# Patient Record
Sex: MALE | Race: WHITE | NOT HISPANIC OR LATINO | Employment: OTHER | URBAN - METROPOLITAN AREA
[De-identification: names, ages, dates, MRNs, and addresses within clinical notes are randomized per-mention and may not be internally consistent; named-entity substitution may affect disease eponyms.]

---

## 2017-02-06 ENCOUNTER — COMMUNICATION - HEALTHEAST (OUTPATIENT)
Dept: HEALTH INFORMATION MANAGEMENT | Facility: CLINIC | Age: 47
End: 2017-02-06

## 2018-02-23 ENCOUNTER — COMMUNICATION - HEALTHEAST (OUTPATIENT)
Dept: FAMILY MEDICINE | Facility: CLINIC | Age: 48
End: 2018-02-23

## 2018-03-26 ENCOUNTER — OFFICE VISIT - HEALTHEAST (OUTPATIENT)
Dept: FAMILY MEDICINE | Facility: CLINIC | Age: 48
End: 2018-03-26

## 2018-03-26 DIAGNOSIS — Z13.220 ENCOUNTER FOR SCREENING FOR LIPOID DISORDERS: ICD-10-CM

## 2018-03-26 DIAGNOSIS — Z00.00 ENCOUNTER FOR GENERAL ADULT MEDICAL EXAMINATION WITHOUT ABNORMAL FINDINGS: ICD-10-CM

## 2018-03-26 DIAGNOSIS — M54.9 MID BACK PAIN: ICD-10-CM

## 2018-03-26 DIAGNOSIS — Z23 NEED FOR VACCINATION: ICD-10-CM

## 2018-03-26 DIAGNOSIS — Z13.1 ENCOUNTER FOR SCREENING FOR DIABETES MELLITUS: ICD-10-CM

## 2018-03-26 DIAGNOSIS — Z72.0 TOBACCO ABUSE: ICD-10-CM

## 2018-03-26 ASSESSMENT — MIFFLIN-ST. JEOR: SCORE: 1659.58

## 2018-03-28 ENCOUNTER — AMBULATORY - HEALTHEAST (OUTPATIENT)
Dept: LAB | Facility: CLINIC | Age: 48
End: 2018-03-28

## 2018-03-28 DIAGNOSIS — Z00.00 ENCOUNTER FOR GENERAL ADULT MEDICAL EXAMINATION WITHOUT ABNORMAL FINDINGS: ICD-10-CM

## 2018-03-28 DIAGNOSIS — Z13.220 ENCOUNTER FOR SCREENING FOR LIPOID DISORDERS: ICD-10-CM

## 2018-03-28 LAB
ANION GAP SERPL CALCULATED.3IONS-SCNC: 13 MMOL/L (ref 5–18)
BUN SERPL-MCNC: 19 MG/DL (ref 8–22)
CALCIUM SERPL-MCNC: 9 MG/DL (ref 8.5–10.5)
CHLORIDE BLD-SCNC: 103 MMOL/L (ref 98–107)
CHOLEST SERPL-MCNC: 185 MG/DL
CO2 SERPL-SCNC: 24 MMOL/L (ref 22–31)
CREAT SERPL-MCNC: 0.93 MG/DL (ref 0.7–1.3)
FASTING STATUS PATIENT QL REPORTED: YES
GFR SERPL CREATININE-BSD FRML MDRD: >60 ML/MIN/1.73M2
GLUCOSE BLD-MCNC: 96 MG/DL (ref 70–125)
HDLC SERPL-MCNC: 40 MG/DL
LDLC SERPL CALC-MCNC: 98 MG/DL
POTASSIUM BLD-SCNC: 4 MMOL/L (ref 3.5–5)
SODIUM SERPL-SCNC: 140 MMOL/L (ref 136–145)
TRIGL SERPL-MCNC: 237 MG/DL

## 2019-10-07 ENCOUNTER — OFFICE VISIT - HEALTHEAST (OUTPATIENT)
Dept: FAMILY MEDICINE | Facility: CLINIC | Age: 49
End: 2019-10-07

## 2019-10-07 DIAGNOSIS — Z12.11 SCREEN FOR COLON CANCER: ICD-10-CM

## 2019-10-07 DIAGNOSIS — Z13.220 ENCOUNTER FOR SCREENING FOR LIPOID DISORDERS: ICD-10-CM

## 2019-10-07 DIAGNOSIS — Z72.0 TOBACCO ABUSE: ICD-10-CM

## 2019-10-07 DIAGNOSIS — Z00.01 ENCOUNTER FOR ROUTINE ADULT HEALTH EXAMINATION WITH ABNORMAL FINDINGS: ICD-10-CM

## 2019-10-07 DIAGNOSIS — R07.9 CHEST PAIN, UNSPECIFIED TYPE: ICD-10-CM

## 2019-10-07 LAB
ANION GAP SERPL CALCULATED.3IONS-SCNC: 9 MMOL/L (ref 5–18)
ATRIAL RATE - MUSE: 63 BPM
BUN SERPL-MCNC: 21 MG/DL (ref 8–22)
CALCIUM SERPL-MCNC: 9.6 MG/DL (ref 8.5–10.5)
CHLORIDE BLD-SCNC: 105 MMOL/L (ref 98–107)
CHOLEST SERPL-MCNC: 197 MG/DL
CO2 SERPL-SCNC: 27 MMOL/L (ref 22–31)
CREAT SERPL-MCNC: 1.09 MG/DL (ref 0.7–1.3)
DIASTOLIC BLOOD PRESSURE - MUSE: NORMAL
ERYTHROCYTE [DISTWIDTH] IN BLOOD BY AUTOMATED COUNT: 11 % (ref 11–14.5)
FASTING STATUS PATIENT QL REPORTED: YES
GFR SERPL CREATININE-BSD FRML MDRD: >60 ML/MIN/1.73M2
GLUCOSE BLD-MCNC: 101 MG/DL (ref 70–125)
HCT VFR BLD AUTO: 44.9 % (ref 40–54)
HDLC SERPL-MCNC: 45 MG/DL
HGB BLD-MCNC: 15 G/DL (ref 14–18)
INTERPRETATION ECG - MUSE: NORMAL
LDLC SERPL CALC-MCNC: 121 MG/DL
MCH RBC QN AUTO: 30.7 PG (ref 27–34)
MCHC RBC AUTO-ENTMCNC: 33.4 G/DL (ref 32–36)
MCV RBC AUTO: 92 FL (ref 80–100)
P AXIS - MUSE: 51 DEGREES
PLATELET # BLD AUTO: 190 THOU/UL (ref 140–440)
PMV BLD AUTO: 8.3 FL (ref 7–10)
POTASSIUM BLD-SCNC: 4.2 MMOL/L (ref 3.5–5)
PR INTERVAL - MUSE: 144 MS
QRS DURATION - MUSE: 94 MS
QT - MUSE: 386 MS
QTC - MUSE: 395 MS
R AXIS - MUSE: -3 DEGREES
RBC # BLD AUTO: 4.89 MILL/UL (ref 4.4–6.2)
SODIUM SERPL-SCNC: 141 MMOL/L (ref 136–145)
SYSTOLIC BLOOD PRESSURE - MUSE: NORMAL
T AXIS - MUSE: 16 DEGREES
TRIGL SERPL-MCNC: 157 MG/DL
TSH SERPL DL<=0.005 MIU/L-ACNC: 0.87 UIU/ML (ref 0.3–5)
VENTRICULAR RATE- MUSE: 63 BPM
WBC: 6.2 THOU/UL (ref 4–11)

## 2019-10-07 ASSESSMENT — MIFFLIN-ST. JEOR: SCORE: 1674.61

## 2019-10-22 ENCOUNTER — HOSPITAL ENCOUNTER (OUTPATIENT)
Dept: CARDIOLOGY | Facility: HOSPITAL | Age: 49
Discharge: HOME | End: 2019-10-22
Attending: FAMILY MEDICINE

## 2019-10-22 DIAGNOSIS — R07.9 CHEST PAIN, UNSPECIFIED TYPE: ICD-10-CM

## 2019-10-22 LAB
CV STRESS MAX HR HE: 146
ECHO EJECTION FRACTION ESTIMATED: 55 %
RATE PRESSURE PRODUCT: NORMAL
STRESS ECHO BASELINE DIASTOLIC HE: 86
STRESS ECHO BASELINE HR: 67 BPM
STRESS ECHO BASELINE SYSTOLIC BP: 132
STRESS ECHO CALCULATED PERCENT HR: 86 %
STRESS ECHO LAST STRESS DIASTOLIC BP: 80
STRESS ECHO LAST STRESS SYSTOLIC BP: 178
STRESS ECHO POST EXERCISE DUR MIN: 8 MIN
STRESS ECHO POST EXERCISE DUR SEC: 58 SEC
STRESS ECHO TARGET HR: 170

## 2019-12-17 ENCOUNTER — OFFICE VISIT - HEALTHEAST (OUTPATIENT)
Dept: FAMILY MEDICINE | Facility: CLINIC | Age: 49
End: 2019-12-17

## 2019-12-17 DIAGNOSIS — B36.0 TINEA VERSICOLOR: ICD-10-CM

## 2019-12-17 DIAGNOSIS — L91.8 SKIN TAG: ICD-10-CM

## 2019-12-17 DIAGNOSIS — L82.1 SEBORRHEIC KERATOSIS: ICD-10-CM

## 2019-12-17 ASSESSMENT — MIFFLIN-ST. JEOR: SCORE: 1679.14

## 2021-04-12 ENCOUNTER — OFFICE VISIT - HEALTHEAST (OUTPATIENT)
Dept: FAMILY MEDICINE | Facility: CLINIC | Age: 51
End: 2021-04-12

## 2021-04-12 DIAGNOSIS — Z71.9 HEALTH COUNSELING: ICD-10-CM

## 2021-04-23 ENCOUNTER — OFFICE VISIT - HEALTHEAST (OUTPATIENT)
Dept: FAMILY MEDICINE | Facility: CLINIC | Age: 51
End: 2021-04-23

## 2021-04-23 DIAGNOSIS — Z84.81 FAMILY HISTORY OF BRCA GENE MUTATION: ICD-10-CM

## 2021-04-23 ASSESSMENT — MIFFLIN-ST. JEOR: SCORE: 1678.97

## 2021-05-03 ENCOUNTER — RECORDS - HEALTHEAST (OUTPATIENT)
Dept: ADMINISTRATIVE | Facility: OTHER | Age: 51
End: 2021-05-03

## 2021-05-03 ENCOUNTER — OFFICE VISIT - HEALTHEAST (OUTPATIENT)
Dept: ONCOLOGY | Facility: HOSPITAL | Age: 51
End: 2021-05-03

## 2021-05-03 DIAGNOSIS — Z84.81 FAMILY HISTORY OF BRCA1 GENE POSITIVE: ICD-10-CM

## 2021-05-03 DIAGNOSIS — Z80.3 FAMILY HISTORY OF MALIGNANT NEOPLASM OF BREAST: ICD-10-CM

## 2021-05-03 DIAGNOSIS — Z80.41 FAMILY HISTORY OF MALIGNANT NEOPLASM OF OVARY: ICD-10-CM

## 2021-05-11 ENCOUNTER — AMBULATORY - HEALTHEAST (OUTPATIENT)
Dept: INFUSION THERAPY | Facility: HOSPITAL | Age: 51
End: 2021-05-11

## 2021-05-11 ENCOUNTER — RECORDS - HEALTHEAST (OUTPATIENT)
Dept: ADMINISTRATIVE | Facility: OTHER | Age: 51
End: 2021-05-11

## 2021-05-11 DIAGNOSIS — Z80.3 FAMILY HISTORY OF MALIGNANT NEOPLASM OF BREAST: ICD-10-CM

## 2021-05-11 DIAGNOSIS — Z80.41 FAMILY HISTORY OF MALIGNANT NEOPLASM OF OVARY: ICD-10-CM

## 2021-05-11 DIAGNOSIS — Z84.81 FAMILY HISTORY OF BRCA1 GENE POSITIVE: ICD-10-CM

## 2021-05-27 ENCOUNTER — OFFICE VISIT - HEALTHEAST (OUTPATIENT)
Dept: ONCOLOGY | Facility: CLINIC | Age: 51
End: 2021-05-27

## 2021-05-27 DIAGNOSIS — Z15.09 BRCA1 GENE MUTATION POSITIVE IN MALE: ICD-10-CM

## 2021-05-27 DIAGNOSIS — Z15.03 BRCA1 GENE MUTATION POSITIVE IN MALE: ICD-10-CM

## 2021-05-27 DIAGNOSIS — Z71.83 ENCOUNTER FOR NONPROCREATIVE GENETIC COUNSELING: ICD-10-CM

## 2021-05-27 DIAGNOSIS — Z80.3 FAMILY HISTORY OF MALIGNANT NEOPLASM OF BREAST: ICD-10-CM

## 2021-05-27 DIAGNOSIS — Z80.41 FAMILY HISTORY OF MALIGNANT NEOPLASM OF OVARY: ICD-10-CM

## 2021-05-27 DIAGNOSIS — Z15.01 BRCA1 GENE MUTATION POSITIVE IN MALE: ICD-10-CM

## 2021-06-01 VITALS — HEIGHT: 68 IN | WEIGHT: 183 LBS | BODY MASS INDEX: 27.74 KG/M2

## 2021-06-02 NOTE — PROGRESS NOTES
MALE PREVENTATIVE EXAM    Assessment and Plan:       1. Encounter for routine adult health examination with abnormal findings  I encouraged him to get regular exercise and to eat a healthy diet.  We are going to check fasting labs and he will be notified of the results of they are available.    2. Chest pain, unspecified type  I recommended that we check an EKG in the office today.  I personally reviewed the tracing and provided an initial interpretation which showed normal sinus rhythm and no ST-T wave changes concerning for ACS.  Given the episodic chest pain symptoms he is been experiencing over the past few weeks I recommended pursuing a stress echocardiogram in addition to checking the labs as listed below.  He agrees with the plan.  - Electrocardiogram Perform and Read  - Basic Metabolic Panel  - HM2(CBC w/o Differential)  - Thyroid Cascade  - Echo Stress Exercise; Future    3. Tobacco abuse  I encouraged him to stop smoking.    4. Encounter for screening for lipoid disorders  He is going to work on eating healthy foods and getting regular exercise since his triglycerides were elevated last year.  We are going to recheck fasting cholesterol panel.  - Lipid Cascade- FASTING    5. Screen for colon cancer  - Ambulatory referral for Colonoscopy     Next follow up:  No follow-ups on file.    Immunization Review  Adult Imm Review: Due today, orders placed  Documented tobacco use.  Website and phone contact for QuitPlan given to patient in AVS.    I discussed the following with the patient:   Adult Healthy Living: Importance of regular exercise  Healthy nutrition  Stress management        Subjective:   Chief Complaint: Mine Whitaker is an 49 y.o. male here for a preventative health visit.     HPI: He denies concerns regarding hearing, vision, urination, bowel movements or sleep.  He describes having episodic left-sided chest pain symptoms over the past few weeks.  Symptoms typically only last about a minute before  "they resolve on their own.  He denies diaphoresis, heart palpitations or radiation of the pain.  He is concerned because a cousin of his recently  from a stroke at age 50.  He continues to smoke approximately 2- 3 cigarettes daily.  He is not acutely short of breath or wheezing.  He admits that he is been under significant stress over the past 3 months.  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  On 3/28/2018 his cholesterol panel showed elevated triglycerides at 237, LDL 98, BMP normal.    Social history: , 2 children.  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    Healthy Habits  Are you taking a daily aspirin? No  Do you typically exercising at least 40 min, 3-4 times per week?  NO  Do you usually eat at least 4 servings of fruit and vegetables a day, include whole grains and fiber and avoid regularly eating high fat foods? Yes  Have you had an eye exam in the past two years? Yes  Do you see a dentist twice per year? Yes  Do you have any concerns regarding sleep? No    Safety Screen  If you own firearms, are they secured in a locked gun cabinet or with trigger locks? The patient does not own any firearms  Do you feel you are safe where you are living?: Yes (10/7/2019  9:07 AM)  Do you feel you are safe in your relationship(s)?: Yes (10/7/2019  9:07 AM)      Review of Systems:  Please see above.  The rest of the review of systems are negative for all systems.     Cancer Screening       Status Date      COLONOSCOPY Overdue 2019               History     Reviewed By Date/Time Sections Reviewed    Gisela Griggs CMA 10/7/2019  9:08 AM Tobacco            Objective:   Vital Signs:   Visit Vitals  /74 (Patient Site: Left Arm, Patient Position: Sitting, Cuff Size: Adult Regular)   Pulse 68   Temp 99  F (37.2  C) (Oral)   Resp 16   Ht 5' 8\" (1.727 m)   Wt 186 lb 5 oz (84.5 kg)   BMI 28.33 kg/m           PHYSICAL " EXAM  General Appearance: Alert, cooperative, no distress, appears stated age  Head: Normocephalic, without obvious abnormality, atraumatic  Eyes: PERRL, conjunctiva/corneas clear, EOM's intact  Ears: Normal TM's and external ear canals, both ears  Nose: Nares normal, septum midline,mucosa normal, no drainage  Throat: Lips, mucosa, and tongue normal; teeth and gums normal  Neck: Supple, symmetrical, trachea midline, no adenopathy;  thyroid: not enlarged, symmetric, no tenderness/mass/nodules  Back: Symmetric, no curvature, ROM normal, no CVA tenderness  Lungs: Clear to auscultation bilaterally, respirations unlabored  Heart: Regular rate and rhythm, S1 and S2 normal, no murmur, rub, or gallop,  Abdomen: Soft, non-tender, bowel sounds active all four quadrants,  no masses, no organomegaly  Musculoskeletal: Normal range of motion. No joint swelling or deformity.   Extremities: Extremities normal, atraumatic, no cyanosis or edema  Skin: Skin color, texture, turgor normal, no rashes or lesions  Lymph nodes: Cervical, supraclavicular, and axillary nodes normal  Neurologic: He is alert.  Normal speech.  No focal deficits.  Normal deep tendon reflexes.   Psychiatric: He has a normal mood and affect.                Medication List      as of October 7, 2019  9:47 AM     You have not been prescribed any medications.         Additional Screenings Completed Today:

## 2021-06-03 VITALS
TEMPERATURE: 99 F | HEIGHT: 68 IN | SYSTOLIC BLOOD PRESSURE: 112 MMHG | BODY MASS INDEX: 28.39 KG/M2 | DIASTOLIC BLOOD PRESSURE: 70 MMHG | WEIGHT: 187.31 LBS | HEART RATE: 76 BPM | RESPIRATION RATE: 16 BRPM

## 2021-06-03 VITALS
DIASTOLIC BLOOD PRESSURE: 74 MMHG | WEIGHT: 186.31 LBS | TEMPERATURE: 99 F | SYSTOLIC BLOOD PRESSURE: 116 MMHG | BODY MASS INDEX: 28.24 KG/M2 | RESPIRATION RATE: 16 BRPM | HEIGHT: 68 IN | HEART RATE: 68 BPM

## 2021-06-04 NOTE — PROGRESS NOTES
Assessment / Impression     1. Tinea versicolor  ketoconazole (NIZORAL) 2 % cream   2. Skin tag     3. Seborrheic keratosis           Plan:     The rash on his left shoulder, back and chest region is consistent with tinea versicolor.  He was given a prescription for ketoconazole cream to use daily for 2 weeks.  He will let me know if symptoms do not improve.    We discussed treatment options for the skin tags present under each of his lower eyelids.  He would like to have these removed since they are bothering him.  I cleaned the area with alcohol swabs and used pickups and a #15 blade to remove the tiny skin lesions at the base.  Hemostasis was easily achieved with pressure.  He tolerated this procedure well.    We discussed treatment options for the skin lesion on his left cheek which is consistent with seborrheic keratosis and decided to pursue cryotherapy.  I applied 3 freeze thaw cycles of liquid nitrogen which she tolerated well.  Wound care instructions were given.  He will follow-up if this does not resolve.    Subjective:      HPI: Mine Whitaker is a 49 y.o. male who presents to the clinic to discuss a few skin concerns.  He has a rash on his left shoulder/neck region that is been present for about 6 months.  It can be a little itchy at times.  He has not tried any treatments for this.    He has a tiny skin tag under each lower eyelid.  He reports that this bothers him from time to time and he would like to have these removed if possible.  They are not red or inflamed.    He also has a skin lesion on his left cheek which is round, raised and scaly.  He is wondering if this needs to be removed as well.  It is not painful and it is not draining.        Review of Systems  All other systems reviewed and are negative.     Social History     Tobacco Use   Smoking Status Current Some Day Smoker     Packs/day: 0.00   Smokeless Tobacco Never Used   Tobacco Comment    rare       Family History   Problem Relation  "Age of Onset     Breast cancer Mother      Kidney cancer Cousin      Stroke Cousin      Kidney cancer Cousin        Objective:     /70 (Patient Site: Left Arm, Patient Position: Sitting, Cuff Size: Adult Regular)   Pulse 76   Temp 99  F (37.2  C) (Oral)   Resp 16   Ht 5' 8\" (1.727 m)   Wt 187 lb 5 oz (85 kg)   BMI 28.48 kg/m    Physical Examination: General appearance - alert, well appearing, and in no distress  Eyes: pupils equal and reactive, extraocular eye movements intact  Mouth: mucous membranes moist, pharynx normal without lesions  Neck: supple, no significant adenopathy  Neurological: alert, oriented, normal speech, no focal findings or movement disorder noted.    Extremities: No edema, no clubbing or cyanosis  Psychiatric: Normal affect. Does not appear anxious or depressed.  Skin: There are 2 small noninflamed skin tags under each lower eyelid.  There is a circular, scaly, raised skin lesion on his left cheek consistent with seborrheic keratosis.  He has several oval slightly erythematous spots on the top of his left shoulder and extending towards the back and chest wall and neck.  No excoriation seen.  No central clearing.    No results found for this or any previous visit (from the past 168 hour(s)).    Current Outpatient Medications   Medication Sig     ketoconazole (NIZORAL) 2 % cream Apply to affected area once daily for 2 weeks.     "

## 2021-06-05 VITALS
WEIGHT: 186.4 LBS | RESPIRATION RATE: 18 BRPM | BODY MASS INDEX: 28.25 KG/M2 | SYSTOLIC BLOOD PRESSURE: 110 MMHG | DIASTOLIC BLOOD PRESSURE: 80 MMHG | HEART RATE: 91 BPM | TEMPERATURE: 96.7 F | HEIGHT: 68 IN

## 2021-06-16 PROBLEM — Z15.01 BRCA1 GENE MUTATION POSITIVE IN MALE: Status: ACTIVE | Noted: 2021-05-27

## 2021-06-16 PROBLEM — Z72.0 TOBACCO ABUSE: Status: ACTIVE | Noted: 2018-03-26

## 2021-06-16 PROBLEM — Z15.03 BRCA1 GENE MUTATION POSITIVE IN MALE: Status: ACTIVE | Noted: 2021-05-27

## 2021-06-16 PROBLEM — Z15.09 BRCA1 GENE MUTATION POSITIVE IN MALE: Status: ACTIVE | Noted: 2021-05-27

## 2021-06-16 NOTE — PROGRESS NOTES
"    Assessment & Plan     Family history of BRCA gene mutation   first cousin (the daughter of his mom's sister) with breast cancer,    Referral:   - Ambulatory referral to Genetics        Return in about 6 months (around 10/23/2021). for routine physical    Jeremias Pelayo MD  [facility] 50 y.[facility]. and presents today to discuss the BRCA gene, as this was found in his first cousin (the daughter of his mom's sister) with breast cancer, wondering if he should be genetically screened and if certain screening tests should be considered for him            Objective    /80 (Patient Site: Right Arm, Patient Position: Sitting, Cuff Size: Adult Regular)   Pulse 91   Temp 96.7  F (35.9  C) (Temporal)   Resp 18   Ht 5' 8.25\" (1.734 m)   Wt 186 lb 6.4 oz (84.6 kg)   BMI 28.13 kg/m    Body mass index is 28.13 kg/m .     Physical Exam  General Appearance:    Alert, well hydrated, no distress   Lungs:     clear to auscultation, no wheezes, rales or rhonchi, symmetric air entry     Heart:    Regular rate and rhythm, S1 and S2 normal, no murmur, rub   or gallop, no edema    Skin:   Skin color, texture, turgor normal, no rashes or lesions                  "

## 2021-06-16 NOTE — PROGRESS NOTES
MALE PREVENTATIVE EXAM    Assessment and Plan:       1. Encounter for general adult medical examination without abnormal findings  I encouraged him to start exercising on a regular basis and to continue eating healthy foods.  We are going to check a BMP for diabetes screening purposes as well as to evaluate his kidney function given strong family history of renal cancer.  - Basic Metabolic Panel; Future    2. Encounter for screening for diabetes mellitus  -Basic metabolic panel    3. Encounter for screening for lipoid disorders  - Lipid Cascade; Future    4. Tobacco abuse  I encouraged him to stop smoking.    5. Mid back pain  I offered physical therapy which he declined.  He will let me know if you would like to pursue this in the future.    6. Need for vaccination  - Influenza, Seasonal,Quad Inj, 36+ MOS     Next follow up:  No Follow-up on file.    Immunization Review  Adult Imm Review: Due today, orders placed  Documented tobacco use.  Website and phone contact for QuitPlan given to patient in AVS.    I discussed the following with the patient:   Adult Healthy Living: Importance of regular exercise  Healthy nutrition  Sunscreen        Subjective:   Chief Complaint: Mine Whitaker is an 47 y.o. male here for a preventative health visit.     HPI: He denies concerns regarding hearing, vision, urination, bowel movements, sleep or mood.  He reports having an ear infection of the right side couple weeks ago which has been improving.  He also has a history of mid back pain over the past few years.  This seems to improve with rest.  Lifting and overuse tend to make this issue worse.  Not having radiation down his arms or legs.  He smokes a few cigarettes daily.  He admits that he is not exercising on a regular basis.    Social history: , 2 children.  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    Healthy Habits  Are you taking a daily aspirin? No  Do you typically exercising at least 40 min, 3-4 times per week?   "NO  Do you usually eat at least 4 servings of fruit and vegetables a day, include whole grains and fiber and avoid regularly eating high fat foods? Yes  Have you had an eye exam in the past two years? Yes  Do you see a dentist twice per year? NO  Do you have any concerns regarding sleep? No    Safety Screen  If you own firearms, are they secured in a locked gun cabinet or with trigger locks? The patient does not own any firearms  Do you feel you are safe where you are living?: Yes (3/26/2018  2:41 PM)  Do you feel you are safe in your relationship(s)?: Yes (3/26/2018  2:41 PM)    Review of Systems:  Please see above.  The rest of the review of systems are negative for all systems.     Cancer Screening     Patient has no health maintenance due at this time              History     Reviewed By Date/Time Sections Reviewed    Gisela Griggs CMA 3/26/2018  2:41 PM Tobacco            Objective:   Vital Signs:   Visit Vitals     /74 (Patient Site: Left Arm, Patient Position: Sitting, Cuff Size: Adult Regular)     Pulse 72     Temp 98.7  F (37.1  C) (Oral)     Resp 16     Ht 5' 8\" (1.727 m)     Wt 183 lb (83 kg)     BMI 27.83 kg/m2          PHYSICAL EXAM  General Appearance: Alert, cooperative, no distress, appears stated age  Head: Normocephalic, without obvious abnormality, atraumatic  Eyes: PERRL, conjunctiva/corneas clear, EOM's intact  Ears: Normal TM's and external ear canals, both ears  Nose: Nares normal, septum midline,mucosa normal, no drainage  Throat: Lips, mucosa, and tongue normal; teeth and gums normal  Neck: Supple, symmetrical, trachea midline, no adenopathy;  thyroid: not enlarged, symmetric, no tenderness/mass/nodules  Back: Symmetric, no curvature, ROM normal, no CVA tenderness.  Mildly tender over the lower thoracic spine and paraspinal musculature  Lungs: Clear to auscultation bilaterally, respirations unlabored  Heart: Regular rate and rhythm, S1 and S2 normal, no murmur, rub, or " gallop,  Abdomen: Soft, non-tender, bowel sounds active all four quadrants,  no masses, no organomegaly  Extremities: Extremities normal, atraumatic, no cyanosis or edema  Skin: Skin color, texture, turgor normal, no rashes or lesions  Lymph nodes: Cervical, supraclavicular, and axillary nodes normal  Neurologic: He is alert.  Normal speech.  No focal deficits.  Normal deep tendon reflexes.   Psychiatric: He has a normal mood and affect.                Medication List      Notice  As of 3/26/2018 11:59 PM    You have not been prescribed any medications.          Additional Screenings Completed Today:

## 2021-06-17 NOTE — PROGRESS NOTES
"5/3/2021     Mine Whitaker is a 50 y.o. male who is being evaluated via a billable telephone visit.      The patient has been notified of following:     \"This telephone visit will be conducted via a call between you and your physician/provider. We have found that certain health care needs can be provided without the need for a physical exam.  This service lets us provide the care you need with a short phone conversation.  If a prescription is necessary we can send it directly to your pharmacy.  If lab work is needed we can place an order for that and you can then stop by our lab to have the test done at a later time.    Telephone visits are billed at different rates depending on your insurance coverage. During this emergency period, for some insurers they may be billed the same as an in-person visit.  Please reach out to your insurance provider with any questions.    If during the course of the call the physician/provider feels a telephone visit is not appropriate, you will not be charged for this service.\"    Patient has given verbal consent to a Telephone visit? Yes    What phone number would you like to be contacted at? [phone]    Patient would like to receive their AVS by AVS Preference: Yuridia.     5/3/2021    Referring Provider: Jeremias Pelayo, *     Present for Today's Visit: Mine    Presenting Information:   I met with Mine Whitaker today for genetic counseling (telephone visit due to covid19) to discuss the known BRCA1 mutation in his family. Specifically, the familial BRCA1 mutation is [id number] ([id number]*). He is here today to review this history, cancer screening recommendations, and available genetic testing options.    Personal History:  Mine is a 50 y.o. male.  He does not have any personal history of cancer.      He has not yet started prostate cancer screening or colonoscopies. He recognizes he is due.  He does not regularly do any other cancer screening at this " time.  Mine reported intermittent tobacco use (not every day), and no alcohol use.    Family History: (Please see scanned pedigree for detailed family history information)  Children/Siblings    Daughter, age 9, healthy.    Son, age 4, healthy.    Mine is an only child.  Maternal    Mother passed at 61 from breast cancer diagnosed at age 56.  She also had a history of ovarian cancer diagnosed at age 42.    Maternal aunt (Arleen), age 70, no cancer history but has tested positive for the familial BRCA1 mutation.  This aunt has a daughter (Margarita), age 44, with a history of breast cancer diagnosed at age 42 and tested positive for the familial BRCA1 mutation.  This cousin was the proband for testing for Mine's family.    Maternal uncle who passed in his late 50s from a liver cancer.  He was a smoker and a drinker.    Maternal grandmother passed at age 60 with a history of breast cancer diagnosed at an unknown age.    Maternal first cousin, age 51, with a history of lymphoma of his jaw diagnosed at age 49.    Many extended relatives on his maternal grandmother's side of the family with breast cancers diagnosed at unknown ages.    No cancer history reported in one additional maternal aunt and one additional maternal uncle, his maternal grandfather, or either of his remaining maternal first cousins.  Paternal    Father passed at age 61 from suicide with no cancer history.    No cancer history reported in either of his paternal aunt or uncle, either of his paternal grandparents, or any of his paternal first cousins.      His maternal ethnicity is [place]. His paternal ethnicity is [place].  There is no known Ashkenazi Islam ancestry on either side of his family. There is no reported consanguinity.    Discussion:    We discussed the natural history and genetics of Hereditary Breast and Ovarian Cancer syndrome and hereditary cancers in general. A detailed handout regarding the information we discussed was provided  to Mine at the end of our appointment today and can be found in the after visit summary.  Topics included: inheritance pattern, cancer risks, cancer screening recommendations, and also risks, benefits and limitations of testing.    Mine was able to provide me with copies of both his maternal first cousin's (Margarita) and his maternal aunt's (Arleen) genetic testing results.  They were both tested in [place] and the reports are in [place], but they do detail the mutation identified in the BRCA1 gene, which is an [place]  mutation ([id number]; [id number]*). Based on Mine's maternal aunt's and maternal first-cousin's genetic test results, Mine has a 25% chance of also carrying the same genetic change in the BRCA1 gene. I explained that if his mother truly also carried the familial BRCA1 mutation, Mine would have a 50% chance of also carrying the mutation. He expressed understanding.      We discussed that it would be best to test his mother first to determine if she also carried the familial BRCA1 mutation, and that could explain her cancer history.  Unfortunately she has passed away and was not tested before she passed.  Without being able to go back and test his mother, we cannot be 100% certain that she also has a familial BRCA1 mutation. I explained that while it is most likely that his mother also carried the familial BRCA1 mutation, we have to consider the possibility that there might be a different genetic explanation for his mother's cancer history.      We discussed the BRCA1 and BRCA2 genes, which are associated with a condition known as Hereditary Breast and Ovarian Cancer syndrome (HBOC). Women with a mutation in either of these genes are at increased risk for breast and ovarian cancer. There is also an increased risk for a second primary breast cancer for women. Men with a mutation in either BRCA1 or BRCA2 are at increased risk for male breast and prostate cancer. Both women and  men may also be at increased risk for pancreatic cancer and melanoma.     We discussed that there are additional genes that could cause increased risk for breast and/or ovarian cancer outside of the BRCA1 gene. As many of these genes present with overlapping features in a family and accurate cancer risk cannot always be established based upon the pedigree analysis alone, it would be reasonable for Mine to consider panel genetic testing to analyze multiple genes at once.    Based on this, Mine meets the National Comprehensive Cancer Network (NCCN) criteria for genetic testing of the familial BRCA1 mutation and other high-penetrance breast and/or ovarian cancer susceptibility genes.      We discussed genetic testing options for Mine including targeted site-specific BRCA1 testing for the familial mutation, and expanded testing to include additional genes associated with breast and/or ovarian cancer (BRCANext-Expanded; 23 genes) given his additional family history.  After our discussion, Mine opted to proceed with genetic testing via the BRCANext-Expanded panel through [facility].  Genetic testing is available for 23 genes associated with hereditary gynecologic, breast, and related cancers: BRCANext-Expanded (KELSEY, BARD1, BRCA1, BRCA2, BRIP1, CDH1, CHEK2, DICER1, EPCAM, MLH1, MSH2, MSH6, NBN, NF1, PALB2, PMS2, PTEN, RAD51C, RAD51D, RECQL, SMARCA4, STK11, TP53).  We discussed that some of the genes in the BRCANext-Expanded panel are associated with specific hereditary cancer syndromes and published management guidelines: Hereditary Breast and Ovarian Cancer syndrome (BRCA1, BRCA2), Snyder syndrome (MLH1, MSH2, MSH6, PMS2, EPCAM), Hereditary Diffuse Gastric Cancer (CDH1), Cowden syndrome (PTEN), Li Fraumeni syndrome (TP53), Peutz-Jeghers syndrome (STK11), and Neurofibromatosis type 1 (NF1).    Risk-reducing salpingo-oophorectomy can be considered in women with mutations in BRIP1, RAD51C, or RAD51D.  Breast and/or other cancer risk management guidelines are available for KELSEY, CHEK2, PALB2, NF1, and NBN.  The remaining genes (BARD1, DICER1, RECQL, and SMARCA4) are associated with increased cancer risk and may allow us to make medical recommendations when mutations are identified.    Mine was provided with a detailed brochure from [facility] explaining the BRCANext-Expanded testing.  Consent was obtained and genetic testing for BRCANext-Expanded was sent to [facility]. Turn around time: approximately 5 weeks.    Medical Management: For Mine, we reviewed that the information from genetic testing may determine:    additional cancer screening for which Mine may qualify (i.e. clinical breast exam, more frequent colonoscopies, more frequent dermatologic exams, etc.),    and targeted chemotherapies for Mine if he were to develop certain cancers in the future (i.e. immunotherapy for individuals with Snyder syndrome, PARP inhibitors, etc.).     These recommendations and possible targeted chemotherapies will be discussed in detail once genetic testing is completed.     I explained that Mine will be contacted by our scheduling team to set up a lab appointment to get his blood drawn for genetic testing at either [facility] cancer care lab or [facility] cancer care lab at his earliest convenience.     Plan:  1) Today Mine elected to proceed with BRCANext-Expanded genetic testing (23 genes) through [facility].  2) This information should be available in approximately 3 weeks.  3) Mine will be contacted by our scheduling department to set up a result disclosure appointment over the phone.     Time spent over the phone: 58 minutes    Yesenia Woods MS, [facility]  Licensed Genetic Counselor  [facility]  [phone]

## 2021-06-21 NOTE — LETTER
Letter by Yesenia Woods, Genetic Counselor at      Author: Yesenia Woods, Genetic Counselor Service: -- Author Type: --    Filed:  Encounter Date: 5/3/2021 Status: (Other)       [facility], formerly [facility]  Hereditary Breast and Gynecologic Cancers  Assessing Cancer Risk  Only about 5-10% of cancers are thought to be due to an inherited cancer susceptibility gene.    These families often have:    Several people with the same or related types of cancer    Cancers diagnosed at a young age (before age 50)    Individuals with more than one primary cancer    Multiple generations of the family affected with cancer    Some people may be candidates for genetic testing of more than one gene.  For these families, genetic testing using a cancer panel may be offered.  These panels will test different genes known to increase the risk for breast, ovarian, uterine, and/or other cancers. All of the genes discussed below have published clinical management guidelines for individuals who are found to carry a mutation. The purpose of this handout is to serve as a brief summary of the genes analyzed by the panels used to inquire about hereditary breast and gynecologic cancer:  KELSEY, BRCA1, BRCA2, BRIP1, CDH1, CHEK2, MLH1, MSH2, MSH6, PMS2, EPCAM, PTEN, PALB2, RAD51C, RAD51D, and TP53.  ______________________________________________________________________________  Hereditary Breast and Ovarian Cancer Syndrome   (BRCA1 and BRCA2)  A single mutation in one of the copies of BRCA1 or BRCA2 increases the risk for breast and ovarian cancer, among others.  The risk for pancreatic cancer and melanoma may also be slightly increased in some families.  The chart below shows the chance that someone with a BRCA mutation would develop cancer in his or her lifetime1,2,3,4.      Lifetime Cancer Risks    General Population BRCA   Breast 12% ~80%   Ovarian 1-2% 11-40%   Male Breast <1% 7-8%   Prostate 16% 20%       A persons ethnic background is  also important to consider, as individuals of [place] ancestry have a higher chance of having a BRCA gene mutation.  There are three BRCA mutations that occur more frequently in this population.    Snyder Syndrome   (MLH1, MSH2, MSH6, PMS2, and EPCAM)  Currently five genes are known to cause Snyder Syndrome: MLH1, MSH2, MSH6, PMS2, and EPCAM.  A single mutation in one of the Snyder Syndrome genes increases the risk for colon, endometrial, ovarian, and stomach cancers.  Other cancers that occur less commonly in Snyder Syndrome include urinary tract, skin, and brain cancers.  The chart below shows the chance that a person with Snyder syndrome would develop cancer in his or her lifetime5.      Lifetime Cancer Risks    General Population Snyder Syndrome   Colon 5.5% ~80%   Endometrial 2.7% 15-60%   Stomach <1% 1-13%   Ovarian 1-2% 4-24%       Cowden Syndrome   (PTEN)  Cowden syndrome is a hereditary condition that increases the risk for breast, thyroid, endometrial, colon, and kidney cancer.  Cowden syndrome is caused by a mutation in the PTEN gene.  A single mutation in one of the copies of PTEN causes Cowden syndrome and increases cancer risk.  The chart below shows the chance that someone with a PTEN mutation would develop cancer in their lifetime6,7.  Other benign features seen in some individuals with Cowden syndrome include benign skin lesions (facial papules, keratoses, lipomas), learning disability, autism, thyroid nodules, colon polyps, and larger head size.      Lifetime Cancer Risks    General Population Cowden Synrome   Breast 12% 25-50%   Thyroid 1% Up to 35%   Renal 1-2% Up to 35%   Endometrial 2.7% Up to 28%   Colon  5.5% 9%   Melanoma 2-3% 6%   ** One recent study found breast cancer risk to be increased to 85%     Li-Fraumeni Syndrome   (TP53)  Li-Fraumeni Syndrome (LFS) is a cancer predisposition syndrome caused by a mutation in the TP53 gene. A single mutation in one of the copies of TP53 increases  the risk for multiple cancers. Individuals with LFS are at an increased risk for developing cancer at a young age. The lifetime risk for development of a LFS-associated cancer is 50% by age 30 and 90% by age 60.   Core Cancers: Sarcomas, Breast, Brain, Lung, Leukemias/Lymphomas, Adrenocortical carcinomas  Other Cancers: Gastrointestinal, Thyroid, Skin, Genitourinary    Hereditary Diffuse Gastric Cancer   (CDH1)  Currently, one gene is known to cause hereditary diffuse gastric cancer (HDGC): CDH1.  Individuals with HDGC are at increased risk for diffuse gastric cancer and lobular breast cancer. Of people diagnosed with HDGC, 30-50% have a mutation in the CDH1 gene.  This suggests there are likely other genes that may cause HDGC that have not been identified yet.      Lifetime Cancer Risks    General Population HDGC    Diffuse Gastric  <1% ~80%   Breast 12% 39-52%         Additional Genes  KELSEY  KELSEY is a moderate-risk breast cancer gene. Women who have a mutation in KELSEY can have between a 2-4 fold increased risk for breast cancer compared to the general population8. KELSEY mutations have also been associated with increased risk for pancreatic cancer, however an estimate of this cancer risk is not well understood9. Individuals who inherit two KELSEY mutations have a condition called ataxia-telangiectasia (AT).  This rare autosomal recessive condition affects the nervous system and immune system, and is associated with progressive cerebellar ataxia beginning in childhood.  Individuals with ataxia-telangiectasia often have a weakened immune system and have an increased risk for childhood cancers.    PALB2  Mutations in PALB2 have been shown to increase the risk of breast cancer up to 33-58% in some families; where individuals fall within this risk range is dependent upon family [id number]. PALB2 mutations have also been associated with increased risk for pancreatic cancer, although this risk has not been quantified yet.   Individuals who inherit two PALB2 mutations--one from their mother and one from their father--have a condition called Fanconi Anemia.  This rare autosomal recessive condition is associated with short stature, developmental delay, bone marrow failure, and increased risk for childhood cancers.    CHEK2   CHEK2 is a moderate-risk breast cancer gene.  Women who have a mutation in CHEK2 have around a 2-fold increased risk for breast cancer compared to the general population, and this risk may be higher depending upon family history.11,12,13 Mutations in CHEK2 have also been shown to increase the risk of a number of other cancers, including colon and prostate, however these cancer risks are currently not well understood.    BRIP1, RAD51C and RAD51D  Mutations in BRIP1, RAD51C, and RAD51D have been shown to increase the risk of ovarian cancer and possibly female breast cancer as well14,15 .       Lifetime Cancer Risk    General Population BRIP1 RAD51C RAD51D   Ovarian 1-2% ~5-8% ~5-9% ~7-15%         Inheritance  All of the cancer syndromes reviewed above are inherited in an autosomal dominant pattern.  This means that if a parent has a mutation, each of his or her children will have a 50% chance of inheriting that same mutation.  Therefore, each child--male or female--would have a 50% chance of being at increased risk for developing cancer.    Mutations in some genes can occur de konstantin, which means that a persons mutation occurred for the first time in them and was not inherited from a parent.  Now that they have the mutation, however, it can be passed on to future generations.    Genetic Testing  Genetic testing involves a blood test and will look at the genetic information in the KELSEY, BRCA1, BRCA2, BRIP1, CDH1, CHEK2, MLH1, MSH2, MSH6, PMS2, EPCAM, PTEN, PALB2, RAD51C, RAD51D, and TP53 genes for any harmful mutations that are associated with increased cancer risk.  If possible, it is recommended that the person(s) who has  had cancer be tested before other family members.  That person will give us the most useful information about whether or not a specific gene is associated with the cancer in the family.    Results  There are three possible results of genetic testing:    Positive--a harmful mutation was identified in one or more of the genes    Negative--no mutation was identified in any of the genes on this panel    Variant of unknown significance--a variation in one of the genes was identified, but it is unclear how this impacts cancer risk in the family    Advantages and Disadvantages   There are advantages and disadvantages to genetic testing.    Advantages    May clarify your cancer risk    Can help you make medical decisions    May explain the cancers in your family    May give useful information to your family members (if you share your results)    Disadvantages    Possible negative emotional impact of learning about inherited cancer risk    Uncertainty in interpreting a negative test result in some situations    Possible genetic discrimination concerns (see below)    Genetic Information Nondiscrimination Act (EARLENE)  EARLENE is a federal law that protects individuals from health insurance or employment discrimination based on a genetic test result alone.  Although rare, there are currently no legal discrimination protections in terms of life insurance, long term care, or disability insurances.  Visit the National Human Genome Research [place] website to learn more.    Reducing Cancer Risk  All of the genes described above have nationally recognized cancer screening guidelines that would be recommended for individuals who test positive.  In addition to increased cancer screening, surgeries may be offered or recommended to reduce cancer risk.  Recommendations are based upon an individuals genetic test result as well as their personal and family history of cancer.    Questions to Think About Regarding Genetic Testing:    What  effect will the test result have on me and my relationship with my family members if I have an inherited gene mutation?  If I dont have a gene mutation?    Should I share my test results, and how will my family react to this news, which may also affect them?    Are my children ready to learn new information that may one day affect their own health?    Hereditary Cancer Resources    FORCE: Facing Our Risk of Cancer Empowered facingourrisk.org   Bright Pink bebrightpink.org   Li-Fraumeni Syndrome Association lfsassociation.org   PTEN World PTENworld.com   No stomach for cancer, Inc. nostomachforcancer.org   Stomach cancer relief network Scrnet.org   Collaborative Group of the Americas on Inherited Colorectal Cancer (CGA) cgaicc.com    Cancer Care cancercare.org   American Cancer Society (ACS) cancer.org   National Cancer [place] (NCI) cancer.gov     Please call us if you have any questions or concerns.   Cancer Risk Management Program  q Franc Peterson, MS, [facility] [phone]  q Aiyana Thomas, MS, [facility] [phone]  q Giselle Flores, MS, [facility] [phone]  q Shira Young, MS, [facility] [phone]  q Yesenia Woods, MS, [facility] [phone]  q Lucia Keenan, MS, [facility] [phone]  q Katie Connors, MS, [facility] [phone]      References  1. Emmett Arana PDP, Maximino S, David PIMENTEL, Lacy JE, Cy JL, Celeman N, Gabi H, Greg O, Andrea A, Vale B, Mohit P, Shreyas S, Blanca DM, Benites N, Skip E, Joesph H, Fink E, Marleninski J, Gronjonathan J, Blanquita B, Tomás H, Rejilabecca S, Clint H, Christian H, Hussein K, Aneudy OP. Average risks of breast and ovarian cancer associated with BRCA1 or BRCA2 mutations detected in case series unselected for family history: a combined analysis of 222 studies. Am J Hum Amanda. 2003;72:1117-30.  2. Kwame Payney M, Gerard CARPENTER.  BRCA1 and BRCA2 Hereditary Breast and Ovarian Cancer. Gene Reviews online. 2013.  3. Abhinav YC, Dean S, Bud G, Lizette S. Breast cancer risk among male BRCA1 and  BRCA2 mutation carriers. J Natl Cancer Inst. 2007;99:1811-4.  4. Mike YI, Armand I, Nathaniel J, Milana E, Alber ER, Tucker F. Risk of breast cancer in male BRCA2 carriers. J Med Amanda. 2010;47:710-1.  5. National Comprehensive Cancer Network. Clinical practice guidelines in oncology, colorectal cancer screening. Available online (registration required). 2015.  6. Chauhan MH, Ricco J, Denilson J, Etienne MCCULLOUGH, Saul MS, Boyd C. Lifetime cancer risks in individuals with germline PTEN mutations. Clin Cancer Res. 2012;18:400-7.  7. Pilarski R. Cowden Syndrome: A Critical Review of the Clinical Literature. J Amanda . 2009:18:13-27.  8. Dorian SAHA, Puneet D, Jorge S, Makayla P, Erinn T, Esau M, Jonah B, Gregory H, Alecia R, Anahy K, Ambreen L, Mike YI, Blanca D, Bishop DF, Vanna MR, The Breast Cancer Susceptibility Collaboration ([place]) & Michelle BENITEZ. KELSEY mutations that cause ataxia-telangiectasia are breast cancer susceptibility alleles. Nature Genetics. 2006;38:873-875  9. Louis N , Constantino Y, Hayley J, Gonzalo L, Ariana GM , Skip ML, Gallinger S, Smith AG, Syngal S, Lizet ML, Eric J , Karen R, Anna SZ, Esmaki JR, Ximena VE, Janiya M, Vogelstein B, Pineda N, Vishn RH, Anna KW, and Scott AP. KELSEY mutations in patients with hereditary pancreatic cancer. Cancer Discover. 2012;2:41-46  10. Hayden YE, et al. Breast-Cancer Risk in Families with Mutations in PALB2. NEJM. 2014; 371(6):497-506.  11. CHEK2 Breast Cancer Case-Control Consortium. CHEK2*1100delC and susceptibility to breast cancer: A collaborative analysis involving 10,860 breast cancer cases and 9,065 controls from 10 studies. Am J Hum Amanda, 74 (2004), pp. 8499-8335  12. Altagracia T, Solomon S, Nilda K, et al. Spectrum of Mutations in BRCA1, BRCA2, CHEK2, and TP53 in Families at High Risk of Breast Cancer. VARINDER. 2006;295(12):2812-6100.   13. Kunal MADRIGAL, Judie REILLY, Ghulam SAHA, et al. Risk of breast cancer in women with a  CHEK2 mutation with and without a family history of breast cancer. J Clin Oncol. 2011;29:0667-3939.  14. Braxton H, Connor E, Heriberto SJ, et al. Contribution of germline mutations in the RAD51B, RAD51C, and RAD51D genes to ovarian cancer in the population. J Clin Oncol. 2015;33(26):6008-8520. Doi:10.1200/JCO.2015.61.2408.  15. Evelyne T, Ryan CASH, Dariusz P, et al. Mutations in BRIP1 confer high risk of ovarian cancer. Beatriz Amanda. 2011;43(11):8180-3940. doi:10.1038/ng.955.

## 2021-06-21 NOTE — LETTER
"Letter by Yesenia Woods, Genetic Counselor at      Author: Yesenia Woods, Genetic Counselor Service: -- Author Type: --    Filed:  Encounter Date: 5/3/2021 Status: (Other)         Jeremias Pelayo MD  [address]  [facility] [address]                                  May 3, 2021    Patient: Mine Whitaker   MR Number: [id number]   YOB: 1970   Date of Visit: 5/3/2021     Dear Dr. Gita MD:    Thank you for referring Mine Whitaker to me for evaluation. Below are the relevant portions of my assessment and plan of care.    If you have questions, please do not hesitate to call me. I look forward to following Mine along with you.    Sincerely,        Yesenia Woods, Genetic Counselor          CC  No Recipients  Yesenia Woods, Genetic Counselor  5/3/2021  1:57 PM  Sign when Signing Visit  5/3/2021     Mine Whitaker is a 50 y.o. male who is being evaluated via a billable telephone visit.      The patient has been notified of following:     \"This telephone visit will be conducted via a call between you and your physician/provider. We have found that certain health care needs can be provided without the need for a physical exam.  This service lets us provide the care you need with a short phone conversation.  If a prescription is necessary we can send it directly to your pharmacy.  If lab work is needed we can place an order for that and you can then stop by our lab to have the test done at a later time.    Telephone visits are billed at different rates depending on your insurance coverage. During this emergency period, for some insurers they may be billed the same as an in-person visit.  Please reach out to your insurance provider with any questions.    If during the course of the call the physician/provider feels a telephone visit is not appropriate, you will not be charged for this service.\"    Patient has given verbal consent to a Telephone visit? Yes    What " phone number would you like to be contacted at? [phone]    Patient would like to receive their AVS by AVS Preference: Maria Lvictorinodimitris.     5/3/2021    Referring Provider: Jeremias Pelayo, *     Present for Today's Visit: Mine    Presenting Information:   I met with Mine Whitaker today for genetic counseling (telephone visit due to covid19) to discuss the known BRCA1 mutation in his family. Specifically, the familial BRCA1 mutation is [id number] ([id number]*). He is here today to review this history, cancer screening recommendations, and available genetic testing options.    Personal History:  Mine is a 50 y.o. male.  He does not have any personal history of cancer.      He has not yet started prostate cancer screening or colonoscopies. He recognizes he is due.  He does not regularly do any other cancer screening at this time.  Mine reported intermittent tobacco use (not every day), and no alcohol use.    Family History: (Please see scanned pedigree for detailed family history information)  Children/Siblings    Daughter, age 9, healthy.    Son, age 4, healthy.    Mine is an only child.  Maternal    Mother passed at 61 from breast cancer diagnosed at age 56.  She also had a history of ovarian cancer diagnosed at age 42.    Maternal aunt (Arleen), age 70, no cancer history but has tested positive for the familial BRCA1 mutation.  This aunt has a daughter (Margarita), age 44, with a history of breast cancer diagnosed at age 42 and tested positive for the familial BRCA1 mutation.  This cousin was the proband for testing for Mine's family.    Maternal uncle who passed in his late 50s from a liver cancer.  He was a smoker and a drinker.    Maternal grandmother passed at age 60 with a history of breast cancer diagnosed at an unknown age.    Maternal first cousin, age 51, with a history of lymphoma of his jaw diagnosed at age 49.    Many extended relatives on his maternal grandmother's side of the  family with breast cancers diagnosed at unknown ages.    No cancer history reported in one additional maternal aunt and one additional maternal uncle, his maternal grandfather, or either of his remaining maternal first cousins.  Paternal    Father passed at age 61 from suicide with no cancer history.    No cancer history reported in either of his paternal aunt or uncle, either of his paternal grandparents, or any of his paternal first cousins.      His maternal ethnicity is [place]ales[place]. His paternal ethnicity is [place]alese.  There is no known Ashkenazi Buddhist ancestry on either side of his family. There is no reported consanguinity.    Discussion:    We discussed the natural history and genetics of Hereditary Breast and Ovarian Cancer syndrome and hereditary cancers in general. A detailed handout regarding the information we discussed was provided to Mine at the end of our appointment today and can be found in the after visit summary.  Topics included: inheritance pattern, cancer risks, cancer screening recommendations, and also risks, benefits and limitations of testing.    Mine was able to provide me with copies of both his maternal first cousin's (Margarita) and his maternal aunt's (Arleen) genetic testing results.  They were both tested in [place] and the reports are in [place]alese, but they do detail the mutation identified in the BRCA1 gene, which is an Nepalese  mutation ([id number]; [id number]*). Based on Mine's maternal aunt's and maternal first-cousin's genetic test results, Mine has a 25% chance of also carrying the same genetic change in the BRCA1 gene. I explained that if his mother truly also carried the familial BRCA1 mutation, Mine would have a 50% chance of also carrying the mutation. He expressed understanding.      We discussed that it would be best to test his mother first to determine if she also carried the familial BRCA1 mutation, and that could explain her cancer history.   Unfortunately she has passed away and was not tested before she passed.  Without being able to go back and test his mother, we cannot be 100% certain that she also has a familial BRCA1 mutation. I explained that while it is most likely that his mother also carried the familial BRCA1 mutation, we have to consider the possibility that there might be a different genetic explanation for his mother's cancer history.      We discussed the BRCA1 and BRCA2 genes, which are associated with a condition known as Hereditary Breast and Ovarian Cancer syndrome (HBOC). Women with a mutation in either of these genes are at increased risk for breast and ovarian cancer. There is also an increased risk for a second primary breast cancer for women. Men with a mutation in either BRCA1 or BRCA2 are at increased risk for male breast and prostate cancer. Both women and men may also be at increased risk for pancreatic cancer and melanoma.     We discussed that there are additional genes that could cause increased risk for breast and/or ovarian cancer outside of the BRCA1 gene. As many of these genes present with overlapping features in a family and accurate cancer risk cannot always be established based upon the pedigree analysis alone, it would be reasonable for Mine to consider panel genetic testing to analyze multiple genes at once.    Based on this, Mine meets the National Comprehensive Cancer Network (NCCN) criteria for genetic testing of the familial BRCA1 mutation and other high-penetrance breast and/or ovarian cancer susceptibility genes.      We discussed genetic testing options for Mine including targeted site-specific BRCA1 testing for the familial mutation, and expanded testing to include additional genes associated with breast and/or ovarian cancer (BRCANext-Expanded; 23 genes) given his additional family history.  After our discussion, Mine opted to proceed with genetic testing via the BRCANext-Expanded panel  through [facility].  Genetic testing is available for 23 genes associated with hereditary gynecologic, breast, and related cancers: BRCANext-Expanded (KELSEY, BARD1, BRCA1, BRCA2, BRIP1, CDH1, CHEK2, DICER1, EPCAM, MLH1, MSH2, MSH6, NBN, NF1, PALB2, PMS2, PTEN, RAD51C, RAD51D, RECQL, SMARCA4, STK11, TP53).  We discussed that some of the genes in the BRCANext-Expanded panel are associated with specific hereditary cancer syndromes and published management guidelines: Hereditary Breast and Ovarian Cancer syndrome (BRCA1, BRCA2), Snyder syndrome (MLH1, MSH2, MSH6, PMS2, EPCAM), Hereditary Diffuse Gastric Cancer (CDH1), Cowden syndrome (PTEN), Li Fraumeni syndrome (TP53), Peutz-Jeghers syndrome (STK11), and Neurofibromatosis type 1 (NF1).    Risk-reducing salpingo-oophorectomy can be considered in women with mutations in BRIP1, RAD51C, or RAD51D. Breast and/or other cancer risk management guidelines are available for KELSEY, CHEK2, PALB2, NF1, and NBN.  The remaining genes (BARD1, DICER1, RECQL, and SMARCA4) are associated with increased cancer risk and may allow us to make medical recommendations when mutations are identified.    Mine was provided with a detailed brochure from [facility] explaining the BRCANext-Expanded testing.  Consent was obtained and genetic testing for BRCANext-Expanded was sent to [facility]. Turn around time: approximately 5 weeks.    Medical Management: For Mine, we reviewed that the information from genetic testing may determine:    additional cancer screening for which Mine may qualify (i.e. clinical breast exam, more frequent colonoscopies, more frequent dermatologic exams, etc.),    and targeted chemotherapies for Mine if he were to develop certain cancers in the future (i.e. immunotherapy for individuals with Snyder syndrome, PARP inhibitors, etc.).     These recommendations and possible targeted chemotherapies will be discussed in detail once genetic testing  is completed.     I explained that Mine will be contacted by our scheduling team to set up a lab appointment to get his blood drawn for genetic testing at either [facility] cancer care lab or [facility] cancer care lab at his earliest convenience.     Plan:  1) Today Mine elected to proceed with BRCANext-Expanded genetic testing (23 genes) through [facility].  2) This information should be available in approximately 3 weeks.  3) Mine will be contacted by our scheduling department to set up a result disclosure appointment over the phone.     Time spent over the phone: 58 minutes    Yesenia Woods MS, [facility] [place]  Licensed Genetic Counselor  [facility]  [phone]

## 2021-06-25 NOTE — PROGRESS NOTES
"5/27/2021    Referring Provider: Jeremias Pelayo MD    Presenting Information:   I spoke with Mine over the phone to discuss his genetic testing results. His blood was drawn on 5/11/2021 and we ordered BRCANext-Expanded testing from [facility]. This testing was done because of his family history of breast and ovarian cancer and his maternal aunt's and first-cousin's positive BRCA1 status.     Genetic Testing Results: POSITIVE  Mine is POSITIVE for a BRCA1 mutation. Specifically his mutation is called [id number] (also known as [id number]). Of note, this is the same BRCA1 mutation his maternal aunt and maternal first-cousin have tested positive for. We discuss that this means that his mother was an obligate carrier of this BRCA1 mutation. No other mutations were found in the BRCA1 gene.     We discussed that this mutation is associated with a diagnosis of Hereditary Breast and Ovarian Cancer syndrome and increased risk for male breast and prostate cancer for men and breast and ovarian cancer for women. We discussed the impact of this testing on Mine and his family in detail.     Of note, Mine is negative for mutations in the KELSEY, BARD1, BRCA2, BRIP1, CDH1, CHEK2, DICER1, EPCAM, MLH1, MSH2, MSH6, NBN, NF1, PALB2, PMS2, PTEN, RAD51C, RAD51D, RECQL, SMARCA4, STK11, and TP53 genes. No mutations were found in any of the remaining 22 genes analyzed. This test involved sequencing and deletion/duplication analysis of all genes with the exceptions of EPCAM (deletions/duplications only).    Testing did not detect an identifiable mutation associated with Hereditary Breast and Ovarian Cancer syndrome (BRCA2), Snyder syndrome (MLH1, MSH2, MSH6, PMS2, EPCAM), Hereditary Diffuse Gastric Cancer (CDH1), Cowden syndrome (PTEN), Li Fraumeni syndrome (TP53), Peutz-Jeghers syndrome (STK11), or Neurofibromatosis type 1 (NF1).      A copy of the test report can be found in the Media tab and named \"Genetics " "[facility]\". The report is scanned in as a linked document.    BRCA1 Cancer Risks:  Men with mutations in BRCA1 have a:      Increased lifetime prostate cancer risk, including a higher likelihood for advanced or metastatic disease.      1.2% lifetime male breast cancer risk. This is greater than the general population risk of 0.1%.      Women with mutations in the BRCA1 gene have a:    60-90% lifetime risk of developing breast cancer. This is much greater than the general population breast cancer risk of 12%    39-58% lifetime risk of developing ovarian cancer. Some studies suggest that this risk may be as high as 62%. This is much greater than the general population ovarian cancer risk of 1-2%    Men and Women with mutations in BRCA1 have a:    Up to 5% lifetime risk of developing pancreatic cancer.    Increased lifetime risk of melanoma.     Also, though no exact lifetime risks are available at this time, there may be increased risks for other cancers.     Cancer Screening and Prevention:  The following screening is recommended for men who have a mutation in the BRCA1 gene:    Breast self-exam starting at age 35    Annual clinical breast exams starting at age 35    Consider annual mammogram screening in men with gynecomastia starting at age 50 or 10 years prior to the earliest male breast cancer in the family    Prostate cancer screening, per NCCN guidelines, can be considered starting at age 40.     The following screening is recommended for women who have a mutation in the BRCA1 gene, per current National Comprehensive Cancer Network (NCCN) guidelines.    Breast awareness starting at age 18    Clinical breast exams starting at age 25; repeated every 6-12 months    Annual breast MRI from age 25-29    Annual mammograms with consideration of tomosynthesis and breast MRI alternating every 6 months starting at age 30    Consider transvaginal ultrasound and a CA-125 blood test starting at age 30-35, though the " benefits of this screening are unclear.    Prophylactic mastectomy is a surgical option for women with a BRCA1 mutation, which reduces breast cancer risk by 90%. Chemoprevention with Tamoxifen can reduce breast cancer risk in some women. There are limitations to screening for ovarian cancer. Thus, prophylactic removal of the ovaries and fallopian tubes is an option and is recommended after women are finished planning their families or between 35 and 40.     Some data suggests that there may be an increased risk for serous uterine cancer in women with BRCA1 mutations. However, further research is needed in this area, and data is currently limited. Women with BRCA1 mutations are encouraged to discuss the risks and benefits of hysterectomy at the time of oophorectomy with their provider before surgery.    Using oral contraceptives for five years or more has been shown to reduce ovarian cancer risk by around 50%.  The data are still inconclusive as to whether or not using oral contraceptives will increase breast cancer risk in BRCA1 mutation carriers.     Screening for pancreatic cancer is not offered on a routine basis, as the benefits of current screening methods are unknown. Pancreatic cancer screening may be considered based on family history, though. Screening for melanoma may also be considered.  Some chemotherapies for certain cancers may be more effective in individuals with BRCA1 mutations. Mine should discuss this with his physicians, if chemotherapy is indicated for him in the future.     Other screening based on Mine's personal and family history:    Other population cancer screening options, such as those recommended by the American Cancer Society and the National Comprehensive Cancer Network (NCCN), are also appropriate for Mine and his family. These screening recommendations may change if there are changes to Mine's personal and/or family history. Final screening recommendations should be  made by each individual's managing physician.    We discussed that Mine could participate in our Cancer Risk Management Program in which our nursing specialist provides an individual screening plan and assists with medical management. A referral was placed to see SREEDHAR Powell for this service.    Of note, the above information is based on our current understanding of Mine's genetic findings. Mine is encouraged to reach out to me regularly regarding any pertinent updates to his personal and/or family history of cancer, as our understanding of the genetic findings in his family may change over time.     Implications for Family Members:  We reviewed that mutations in the BRCA1 gene are inherited in an autosomal dominant pattern. This means that each of Mine's children have a 50% chance of inheriting the same mutation. Likewise, each of his siblings has a 50% risk of having the same mutation. Extended relatives may also carry this mutation, and he is encouraged to share this information with his family members on both sides of the family. I am happy to help his relatives connect with a genetic counselor in their area if they would like to discuss testing.    In rare situations in which both parents have a mutation in the BRCA1 gene, their children each have a 25% risk for Fanconi anemia type S. Fanconi anemia is a rare condition with onset in childhood that often results in physical abnormalities, growth retardation, bone marrow failure, and increased risk for cancer. Of note, bone marrow failure and immune deficiency (features of classic Fanconi anemia types) have not been reported in Fanconi anemia type S at this time. For individuals of childbearing age with BRCA1 mutations, genetic counseling and genetic testing may be advised for their partners.    Additional Testing Considerations:  Other relatives may also carry the familial BRCA1 mutation or a different gene mutation associated with  "breast and/or ovarian cancer. Genetic counseling is recommended for any of his maternal relatives who have not yet undergone genetic testing to discuss genetic testing options. If any of these relatives do pursue genetic testing, Mine is encouraged to contact me so that we may review the impact of their test results on iM[facility].    Support Resources:  I provided Mine with information regarding national and local support resources.    Plan:  1.  I provided Mine with a copy of his test results and support resources today.  2.  I will provide a \"Dear Relative\" letter for Mine to share with his family members.  3.  He plans to follow up with his primary care provider for routine medical care.  4.  A referral was placed for Mine to meet with SREEDHAR Powell to discuss screening associated with a BRCA1 mutation.    If Mine has additional questions, I encouraged him to contact me directly at [phone].     Time spent over the phone: 18 minutes    Yesenia Woods MS, [facility] [place]  Licensed Genetic Counselor  [facility]  [phone]      "

## 2021-06-26 ENCOUNTER — HEALTH MAINTENANCE LETTER (OUTPATIENT)
Age: 51
End: 2021-06-26

## 2021-07-04 NOTE — LETTER
Letter by Yesenia Woods, Genetic Counselor at      Author: Yesenia Woods, Genetic Counselor Service: -- Author Type: --    Filed:  Encounter Date: 5/27/2021 Status: (Other)       5/27/2021    Dear Relative:  The purpose of this letter is to inform you that your relative recently underwent genetic counseling and genetic testing due to the family history of breast and ovarian cancer.  The testing done through [facility] identified a mutation in the BRCA1 gene.  Specifically, the mutation is called [id number] (also known as p.[id number]).  The accession number linked to your relative's test report is [id number].  A mutation (or change in the genetic code) causes a specific gene to stop working properly.  Ultimately, individuals who have a mutation in this BRCA1 gene have a diagnosis of hereditary breast and ovarian cancer syndrome.    BRCA1 mutations increase the lifetime risk of breast cancer up to 50-85%.  They also increase the lifetime risk for ovarian cancer up to 58%.  Men with this BRCA1 mutation are at increased risk for male breast cancer and prostate cancer.  Some families with BRCA1 mutations show increased risk of pancreatic cancer or melanoma.  In addition, both men and women have a 50% chance of passing this mutation on to each of their children.  If individuals are found to have a mutation in the BRCA1 gene, we would recommend increased cancer screening at younger ages.  Risk reduction surgeries are also available options that can prevent the development of certain cancers.    In rare situations in which both parents have a mutation in the BRCA1 gene, their children each may have a 25% risk for Fanconi anemia. Fanconi anemia is a rare condition with onset in childhood.  Fanconi anemia often results in physical abnormalities, growth retardation, bone marrow failure, and increased risk for cancer.  For individuals of childbearing age with BRCA1 mutations, genetic counseling and genetic  testing may be advised for their partners.    I understand that this may be surprising, unexpected, and even scary news.  Because this mutation has been identified in your family, you are at risk for having the same mutation.  As mentioned earlier, your children may also be at risk.  Thus, I encourage you to contact me with questions or to schedule a genetic counseling appointment.  If you do not live in the [facility] area, I would be happy to provide you with contact information for genetic counselors in your city or state.  Genetic counselors can be found by visiting [email or web address].  Scheduling a genetic counseling appointment does not mean you have to undergo genetic testing.  The decision to pursue such testing is a very personal one that is discussed in more detail during the session.  Indeed, much of cancer genetic counseling is providing valuable information to individuals who are impacted by genetic information such as this.    Sincerely,   Yesenia Woods MS, [facility] [place]  Licensed Genetic Counselor  [facility]

## 2021-07-04 NOTE — LETTER
Letter by Yesenia Woods, Genetic Counselor at      Author: Yesenia Woods, Genetic Counselor Service: -- Author Type: --    Filed:  Encounter Date: 5/27/2021 Status: (Other)       Resources for Men with a BRCA1 or BRCA2 Mutation    FORCE: Facing Our Risk of Cancer Empowered  www.GraphScience.org  FORCEs mission is to improve the lives of individuals and families affected by hereditary breast, ovarian, and related cancers by creating awareness, supplying information and support to the community, advocating for and supporting research, and working with the research and medical communities.     Helpline: 1-361.228.2749    Message boards    Peer Navigation and local support groups    Information for BRCA+ Men: http://www.GraphScience.org/understanding-brca-and-hboc/information/previvors-survivors/men/basics/overview.php   Talking About BRCA in Your Family Tree  http://www.GraphScience.org/understanding-brca-and-hboc/publications/documents/kwofmpg-ppweity-qbbbn-brca-family.pdf  Prostate Cancer Foundation www.pcf.org/   The Prostate Cancer Foundation was started in 2003. This organization funds research for prevention, detection, and treatment, and hopefully one day a cure for prostate cancer.   Us TOO www.ustoo.org  UsTOO was started by men in [place] in 1990. This is now an international effort to provide education and support to men diagnosed with prostate cancer.  This website also has a prostate cancer support group search function to locate a support group near you.  Pancreatic Cancer Action Network www.pancan.org  The Pancreatic Cancer Action Network aims to fight pancreatic cancer, with a goal of doubling survival by 2020. This organization hosts Sxbbme - a walk to support pancreatic cancer research. There are also ways to get involved with this organization through volunteering and advocacy work.    BRCA Information Support Group  People with BRCA1 or BRAC2 mutations face complex emotional challenges  and complicated medical decisions due to high cancer risks and their impact on individuals and families. This group brings people with a BRAC1 or BRAC2 genetic diagnosis together with others facing similar challenges. The group meets nine times per year. For information, please contact Pooja@[employer] or call [phone].  ascentify www.manetchlubtwincities.org  anywayanydays ServiceBench [facility] is a 501(c)3 nonprofit and the local affiliate of the Cancer Support Community, a network of more than 54 supportive, free and welcoming clubhouses where everyone living with cancer can come for social, emotional and psychological support. Clubs are healing environments where individuals learn from each other with guidance from licensed professionals.  Books:  Confronting Hereditary Breast and Ovarian Cancer: Identify Your Risk, Understand Your Options, Change Your Lanette, Chastity Richards

## 2021-07-04 NOTE — LETTER
Letter by Yesenia Woods, Genetic Counselor at      Author: Yesenia Woods, Genetic Counselor Service: -- Author Type: --    Filed:  Encounter Date: 5/27/2021 Status: (Other)         Jeremias Pelayo MD  [address]  [facility] [address]                                  May 27, 2021    Patient: Mine Whitaker   MR Number: [id number]   YOB: 1970   Date of Visit: 5/27/2021     Dear Dr. Gita MD:    Thank you for referring Mine Whitaker to me for evaluation. Below are the relevant portions of my assessment and plan of care.    If you have questions, please do not hesitate to call me. I look forward to following Mine along with you.    Sincerely,        Yesenia Woods, Genetic Counselor          CC  Hans Stock, Yesenia Delgado, Genetic Counselor  5/27/2021  1:38 PM  Sign when Signing Visit  5/27/2021    Referring Provider: Jeremias Pelayo MD    Presenting Information:   I spoke with Mine over the phone to discuss his genetic testing results. His blood was drawn on 5/11/2021 and we ordered BRCANext-Expanded testing from [facility]. This testing was done because of his family history of breast and ovarian cancer and his maternal aunt's and first-cousin's positive BRCA1 status.     Genetic Testing Results: POSITIVE  Mine is POSITIVE for a BRCA1 mutation. Specifically his mutation is called [id number] (also known as [id number]). Of note, this is the same BRCA1 mutation his maternal aunt and maternal first-cousin have tested positive for. We discuss that this means that his mother was an obligate carrier of this BRCA1 mutation. No other mutations were found in the BRCA1 gene.     We discussed that this mutation is associated with a diagnosis of Hereditary Breast and Ovarian Cancer syndrome and increased risk for male breast and prostate cancer for men and breast and ovarian cancer for women. We discussed the impact of this  "testing on Mine and his family in detail.     Of note, Mine is negative for mutations in the KELSEY, BARD1, BRCA2, BRIP1, CDH1, CHEK2, DICER1, EPCAM, MLH1, MSH2, MSH6, NBN, NF1, PALB2, PMS2, PTEN, RAD51C, RAD51D, RECQL, SMARCA4, STK11, and TP53 genes. No mutations were found in any of the remaining 22 genes analyzed. This test involved sequencing and deletion/duplication analysis of all genes with the exceptions of EPCAM (deletions/duplications only).    Testing did not detect an identifiable mutation associated with Hereditary Breast and Ovarian Cancer syndrome (BRCA2), Snyder syndrome (MLH1, MSH2, MSH6, PMS2, EPCAM), Hereditary Diffuse Gastric Cancer (CDH1), Cowden syndrome (PTEN), Li Fraumeni syndrome (TP53), Peutz-Jeghers syndrome (STK11), or Neurofibromatosis type 1 (NF1).      A copy of the test report can be found in the Media tab and named \"Genetics Scan-[facility]\". The report is scanned in as a linked document.    BRCA1 Cancer Risks:  Men with mutations in BRCA1 have a:      Increased lifetime prostate cancer risk, including a higher likelihood for advanced or metastatic disease.      1.2% lifetime male breast cancer risk. This is greater than the general population risk of 0.1%.      Women with mutations in the BRCA1 gene have a:    60-90% lifetime risk of developing breast cancer. This is much greater than the general population breast cancer risk of 12%    39-58% lifetime risk of developing ovarian cancer. Some studies suggest that this risk may be as high as 62%. This is much greater than the general population ovarian cancer risk of 1-2%    Men and Women with mutations in BRCA1 have a:    Up to 5% lifetime risk of developing pancreatic cancer.    Increased lifetime risk of melanoma.     Also, though no exact lifetime risks are available at this time, there may be increased risks for other cancers.     Cancer Screening and Prevention:  The following screening is recommended for men who have a mutation " in the BRCA1 gene:    Breast self-exam starting at age 35    Annual clinical breast exams starting at age 35    Consider annual mammogram screening in men with gynecomastia starting at age 50 or 10 years prior to the earliest male breast cancer in the family    Prostate cancer screening, per NCCN guidelines, can be considered starting at age 40.     The following screening is recommended for women who have a mutation in the BRCA1 gene, per current National Comprehensive Cancer Network (NCCN) guidelines.    Breast awareness starting at age 18    Clinical breast exams starting at age 25; repeated every 6-12 months    Annual breast MRI from age 25-29    Annual mammograms with consideration of tomosynthesis and breast MRI alternating every 6 months starting at age 30    Consider transvaginal ultrasound and a CA-125 blood test starting at age 30-35, though the benefits of this screening are unclear.    Prophylactic mastectomy is a surgical option for women with a BRCA1 mutation, which reduces breast cancer risk by 90%. Chemoprevention with Tamoxifen can reduce breast cancer risk in some women. There are limitations to screening for ovarian cancer. Thus, prophylactic removal of the ovaries and fallopian tubes is an option and is recommended after women are finished planning their families or between 35 and 40.     Some data suggests that there may be an increased risk for serous uterine cancer in women with BRCA1 mutations. However, further research is needed in this area, and data is currently limited. Women with BRCA1 mutations are encouraged to discuss the risks and benefits of hysterectomy at the time of oophorectomy with their provider before surgery.    Using oral contraceptives for five years or more has been shown to reduce ovarian cancer risk by around 50%.  The data are still inconclusive as to whether or not using oral contraceptives will increase breast cancer risk in BRCA1 mutation carriers.     Screening for  pancreatic cancer is not offered on a routine basis, as the benefits of current screening methods are unknown. Pancreatic cancer screening may be considered based on family history, though. Screening for melanoma may also be considered.  Some chemotherapies for certain cancers may be more effective in individuals with BRCA1 mutations. Mine should discuss this with his physicians, if chemotherapy is indicated for him in the future.     Other screening based on Mine's personal and family history:    Other population cancer screening options, such as those recommended by the American Cancer Society and the National Comprehensive Cancer Network (NCCN), are also appropriate for Mine and his family. These screening recommendations may change if there are changes to Mine's personal and/or family history. Final screening recommendations should be made by each individual's managing physician.    We discussed that Mine could participate in our Cancer Risk Management Program in which our nursing specialist provides an individual screening plan and assists with medical management. A referral was placed to see SREEDHAR Powell for this service.    Of note, the above information is based on our current understanding of Mine's genetic findings. Mine is encouraged to reach out to me regularly regarding any pertinent updates to his personal and/or family history of cancer, as our understanding of the genetic findings in his family may change over time.     Implications for Family Members:  We reviewed that mutations in the BRCA1 gene are inherited in an autosomal dominant pattern. This means that each of Mine's children have a 50% chance of inheriting the same mutation. Likewise, each of his siblings has a 50% risk of having the same mutation. Extended relatives may also carry this mutation, and he is encouraged to share this information with his family members on both sides of the family. I am  "happy to help his relatives connect with a genetic counselor in their area if they would like to discuss testing.    In rare situations in which both parents have a mutation in the BRCA1 gene, their children each have a 25% risk for Fanconi anemia type S. Fanconi anemia is a rare condition with onset in childhood that often results in physical abnormalities, growth retardation, bone marrow failure, and increased risk for cancer. Of note, bone marrow failure and immune deficiency (features of classic Fanconi anemia types) have not been reported in Fanconi anemia type S at this time. For individuals of childbearing age with BRCA1 mutations, genetic counseling and genetic testing may be advised for their partners.    Additional Testing Considerations:  Other relatives may also carry the familial BRCA1 mutation or a different gene mutation associated with breast and/or ovarian cancer. Genetic counseling is recommended for any of his maternal relatives who have not yet undergone genetic testing to discuss genetic testing options. If any of these relatives do pursue genetic testing, Mine is encouraged to contact me so that we may review the impact of their test results on Mine.    Support Resources:  I provided Mine with information regarding national and local support resources.    Plan:  1.  I provided Mine with a copy of his test results and support resources today.  2.  I will provide a \"Dear Relative\" letter for Mine to share with his family members.  3.  He plans to follow up with his primary care provider for routine medical care.  4.  A referral was placed for Mine to meet with SREEDHAR Powell to discuss screening associated with a BRCA1 mutation.    If Mine has additional questions, I encouraged him to contact me directly at [phone].     Time spent over the phone: 18 minutes    Yesenia Woods MS, [facility]  Licensed Genetic Counselor  [facility]  [phone]

## 2021-07-08 ENCOUNTER — TRANSCRIBE ORDERS (OUTPATIENT)
Dept: ONCOLOGY | Facility: CLINIC | Age: 51
End: 2021-07-08

## 2021-07-08 DIAGNOSIS — Z12.11 SPECIAL SCREENING FOR MALIGNANT NEOPLASMS, COLON: Primary | ICD-10-CM

## 2021-07-09 ENCOUNTER — TRANSCRIBE ORDERS (OUTPATIENT)
Dept: ONCOLOGY | Facility: CLINIC | Age: 51
End: 2021-07-09

## 2021-07-09 DIAGNOSIS — Z12.11 SPECIAL SCREENING FOR MALIGNANT NEOPLASMS, COLON: Primary | ICD-10-CM

## 2021-07-26 ENCOUNTER — TELEPHONE (OUTPATIENT)
Dept: GASTROENTEROLOGY | Facility: CLINIC | Age: 51
End: 2021-07-26

## 2021-07-26 NOTE — PROGRESS NOTES
Colonoscopy  Your exam is on 9/10/2021  Arrival Time: 9:00a  Please note that your procedure time may change  Check in at: [facility]; [address], [place] [address]    Please arrive with an adult who can drive you home after the exam and stay with you for the next 24 hours, unless your provider says otherwise.   The medicines used in the exam will make you sleepy. You will not be able to drive. You cannot take a medical cab, taxi, Uber, train or bus by yourself.    What is a colonoscopy?  A colonoscopy lets the doctor look inside your large intestine (colon). The doctor guides a scope, or small camera, through the entire colon. The scope is attached to a long, flexible tube. The image from the scope appears on a large TV screen.     The scope will send air into your colon. This opens the colon so the doctor can see it better on the screen.    The exam looks for defects such as inflamed tissue, polyps, ulcers (sores), diverticula (pouches in the wall of the colon) and signs of cancer.    Getting ready  Read your guidelines for cleansing the colon. It is highly important that you follow the directions closely. If your colon is empty and clean, your exam will be more thorough and take less time.     Be sure to tell your doctor or nurse if you have ever had lung or heart disease (including endocarditis or an artificial valve); diabetes; hepatitis; or any allergies to medicines.      Dress in comfortable, loose clothing.    Bring your insurance card. Leave your purse, billfold, credit cards and other valuables at home.    Bring a list of your medicines and known allergies. If you have a pacemaker or ICD, please bring your information card.    We do our best to stay on time, but there may be a delay. Please bring something to pass the time, such as a newspaper or book.    Seven days before the exam - Date: 9/3/2021    Talk to your doctor: If you take blood-thinners (such as Coumadin,  Plavix, Xarelto), your prescription or schedule may need to change before the test.    Continue taking prescribed aspirin; talk to your prescribing doctor with any concerns.      If you have diabetes: Ask to have your exam early in the morning. Also, ask your doctor if you should change your diet or medicines    You will receive a call prior to your appointment to discuss bowel prep. Please let us know if you have any outstanding questions from these instructions.     One day before the exam - Date: 9/9/2021    Stop eating all solid foods, the timing can change based on your prep instructions. You may drink clear liquids.    Day of the exam - Date: 9/10/2021    You may drink clear liquids until 6 hours before your exam.    You may take all of your morning medicines (except for diabetes pills) as usual with 4 oz. of water up to 4 hours before your test.    If you take diabetes medicine (pills): do not take them the morning of your test.    Bring a list of your medicines and known allergies.    Please arrive with an adult who can take you home after the test: The medicine will make you sleepy. If you do not have a , we may cancel your test.     What are clear liquids?   You may have:  - Water, tea, coffee (no cream)  - Soda pop, Gatorade (not red or purple)  - Clear nutrition drinks (Enlive, Resource Breeze)   - Jell-O, Popsicles (no milk or fruit pieces) or sorbet (not red or purple)  - Fat-free soup broth or bouillon  - Plain hard candy, such as clear life savers (not red or purple)  - Clear juices and fruit-flavored drinks such as apple juice, white grape juice, Hi-C and Alejo-Aid (not red or purple)   Do not have:  - Milk or milk products such as ice cream, malts or shakes  - Red or purple drinks of any kind such as cranberry juice or grape juice. Avoid red or purple Jell-O, Popsicles, Alejo-Aid, sorbet and candy  - Juices with pulp such as orange, grapefruit, pineapple or tomato juice  - Cream soups of any  kind  - Alcohol       What happens when I arrive?    You will fill out some paperwork, then we will take you to a private area. A nurse will check your records and ask you questions.    You will change into a hospital gown. We may ask you to remove any jewelry.     We will review the risks and benefits of the exam. You will need to sign a consent form.    We will insert an IV (thin tube) into a vein in your hand or arm. We will use this to give you medicine.    During the exam  The exam takes from 15 minutes to one hour. You may ask questions of the doctor.    You will lie on your left side on a table.     You will receive medicines to relieve pain and help you relax.     The doctor will insert the scope into your rectum (bottom). The scope is on a long, thin tube. The doctor will slowly guide the scope into your colon. The tube bends, so it can move through the curves of your colon.     We may ask you to change positions to help the doctor move the scope.     If we see any polyps (growths), we will remove them. We do this because polyps can cause bleeding or turn into cancer. For some polyps, we will take tissue (a biopsy) to test in the lab. Most polyps turn out to be benign (not cancer).     Will it hurt?  You should feel little or no discomfort. The air will make you will feel full, and you will notice some pressure as the tube passes through your colon. Tell your doctor or nurse if you feel any pain. If you have cramps, breathe slowly to help your body relax.     After the exam    We will take you to the recovery area. We will watch you for up to one hour or until most of the medicine has worn off.     We will remove the IV. You will receive a report about your exam.    Discomfort: It is normal to feel bloated, or pass air. Walking will help relieve it. You may take a non-aspirin pain reliever containing acetaminophen, such as Tylenol. You may have some minor bleeding if a polyp was removed.    Your first meal  should be light. Slowly return to normal meals, unless your doctor gives you other orders. Take it easy the rest of the day.    Have a responsible adult stay with you the rest of the day. Do not drive for 24 hours.    After sedation: You may have slowed reaction time and poor judgment for 24 hours. Do not have anyone under your care that day. Avoid making important decisions or signing legal documents.    If you had a polyp removed:     Avoid heavy exercise for one week (no heavy lifting, sports or other activity).    You may have bleeding for several days after your exam. Call your doctor if bleeding is heavy or you have black or bloody stools (bowel movements).    If you normally take aspirin or blood thinners, ask the prescribing doctor when you can start taking them again.    You may receive more than one bill for your exam. (Separate charges may come from the clinic, doctor, lab, etc.).    Call us at once if you have:    Unusual pain or problems swallowing, unusual stomach or chest pain.    Vomit that looks like coffee grounds or black or bloody stools (bowel movements).    A fever above 100.6  F (37.5  C) when taken under the tongue.    Test results  - You will receive your results in 7 to 10 business days by phone, letter or MyChart.    You will receive a call prior to your appointment to discuss bowel prep. Please let us know if you have any outstanding questions from these instructions.     Schedule your Covid Test:   Please ensure your COVID test is scheduled within 96 hours or 4 days of your procedure. If you have not been contacted to schedule please call [phone].    For questions or appointments, call:  [facility] Endoscopy: [phone], option 2  Monday through Friday, 8 a.m. to 4:30 p.m.  (If it is after hours, call [phone]. Ask for the GI fellow resident on call.)    You should be aware that your endoscopist may be a part of a study to improve endoscopy procedures.  As part of  a study, pictures gathered from your procedure may be stored and analyzed in a de-identified manner.

## 2021-07-26 NOTE — PROGRESS NOTES
Screening Questions  1. Are you active on mychart? Yes    2. What insurance is in the chart? [employer] (Did not have info at time of call. Will call back to update)    2.  Ordering/Referring Provider: Loulou Monk APRN CNS    3. BMI 25.8    4. Are you on daily home oxygen? No    5. Do you have a history of difficult airway? No    6. Have you had a heart, lung, or liver transplant? No    7. Are you currently on dialysis? No    8. Have you had a stroke or Transient ischemic atttack (TIA) within 6 months? No    9. In the past 6 months, have you had any heart related issues including cardiomyopathy or heart attack?         If yes, did it require cardiac stenting or other implantable device?No    10. Do you have any implantable devices in your body (pacemaker, defib, LVAD)? No    11. Do you take nitroglycerin? If yes, how often? No    12. Are you currently taking any blood thinners?No    13. Are you a diabetic? No    14. (Females) Are you currently pregnant? No  If yes, how many weeks?    15. Have you had a procedure in the past that was difficult to tolerate with conscious sedation? Any allergies to Fentanyl or Versed No    16. Are you taking any scheduled prescription narcotics more than once daily? No    17. Do you have any chemical dependencies such as alcohol, street drugs, or methadone? No    18. Do you have any history of post-traumatic stress syndrome or mental health issues? No    19. Do you transfer independently? Yes    20.  Do you have any issues with constipation? No    21. Preferred Pharmacy for Pre Prescription [address] , [place] [address]    Scheduling Details    Colonoscopy Prep Sent?:   Procedure Scheduled: Colonoscopy  Provider/Surgeon: Dr. Rodriguez  Date of Procedure: 9/10/2021  Location: [place]  Caller (Please ask for phone number if not scheduled by patient): Patient      Sedation Type: CS  Conscious Sedation- Needs  for 6 hours after the  procedure  MAC/General-Needs  for 24 hours after procedure    Pre-op Required at [facility] [place], [place] and OR for MAC sedation:   (if yes advise patient they will need a pre-op prior to procedure)      Is patient on blood thinners? -No (If yes- inform patient to follow up with PCP or provider for follow up instructions)     Informed patient they will need an adult  Yes  Cannot take any type of public or medical transportation alone    Informed Patient of COVID Test Requirement Yes    Confirmed Nurse will call to complete assessment Yes    Additional comments: N/a

## 2021-07-26 NOTE — PROGRESS NOTES
How to Prepare for Your Procedure Using Split-Dose Miralax  A colonoscopy is a test that lets the doctor see inside the colon (large intestine). During the inside of the colon is closely visualized on a video monitor. During this procedure the doctor may take photos of the colon lining, remove small tissue samples (biopsies), and remove polyps (growths that may have the potential to become colon cancer if not removed).     It is critical that the bowel is completely cleaned out for the procedure. Residual stool in the colon may obstruct the view or clog up the colonoscope, which can result in a less than adequate examination and a need to repeat the exam. It is important that you follow all the instruction steps before your exam. While this set of instructions yields excellent results in the majority of patients, some patients (such as those with history of constipation, for example) require more a more intensive cleansing regimen. If you have any questions, please call our nurse line at [phone], option 2.      Getting ready     A nurse will call you about 1 week before your exam to review prep instructions with you.     Ask about your medications   You may need to stop taking some of your medications before your procedure. Talk to your doctor as your prescription or schedule may need to change before the test. The following are common examples:      Blood thinners  These medicines (such as Coumadin, Plavix, Eliquis, Xarelto, Lovenox, and others) are used to treat or prevent blood clots, heart attacks, and strokes. Ask the doctor who prescribes this medication for you when to stop taking it. You can continue taking aspirin.      Medications for diabetes  If you take insulin or other medications for diabetes, you may need to change the dose. Ask the doctor who prescribes the medication what you should do the day before the procedure because you will be on a clear liquid diet. Ask to have your exam early in the  morning.        7 days before the exam: 9/3/2021    Stop taking fiber and iron supplements     Stop eating nuts and foods that contain seeds. These can stay in the colon for many days and they can clog up the colonoscope.     Go to the drug store and buy:     Four (4) Dulcolax (Bisacodyl) tablets     One 8.3 ounce bottle of Miralax     Four (4) Simethicone (Mylicon) gas relief tablets  64 ounce of Gatorade (not red or purple)     10 ounce bottle of clear Magnesium Citrate       3 days before the exam: 9/7/2021    Begin a low-fiber diet (see attached information on a low fiber diet).     Drink at least 4 to 6 large glasses of sports drink (not red or purple) each day.      One day before the exam:9/9/2021     Only clear liquid diet is allowed (see attached list). Drink at least 8 to 10 full glasses of clear liquids during the day.     You will start drinking the colonoscopy prep solution at ~ 5 PM. The solution is taken in two steps. Note that the second step is ideally taken ~ 6 hours before the procedure; therefore, the timing of this step depends on your appointment time for the examination.     It is very important to drink the solution quickly because that generates the necessary flush through the intestine.     Once you start drinking the solution, stay near a toilet while using this medicine. Besides diarrhea (watery stools), you may have mild cramping, bloating and nausea.      STEP ONE     At 4 pm, take 2 Dulcolax (Bisacodyl) tablets.     At 4 pm, mix the entire bottle of Miralax with 64 ounces of Gatorade in a pitcher and stir to dissolve the powder. Chill if desired, but do not add ice.     At 5 pm, start drinking an 8-ounce glass of the Miralax and Gatorade mixture every 15 minutes until the pitcher is half empty (about 4 glasses).  Store the rest in the refrigerator.     Bowel movements usually begin about one hour after your finish this first dose of Miralax. The stool is likely to be brown at this  stage.      You can put some petroleum jelly (Vaseline) on the skin around your anus after each bowel movement to prevent irritation. You can also use wet wipes.     Continue to drink clear liquids.      STEP TWO (If you were told to arrive for your colonoscopy before 11 AM)     At 11 PM take 2 Dulcolax (Bisacodyl) tablets.     At 11 PM start drinking an 8-ounce glass of Miralax and Gatorade mixture every 15 minutes until the pitcher is empty (about 4 glasses).     Take four Simethicone (Mylicon) gas relief tablets after the prep is finished.     Drink 10 ounces of clear Magnesium Citrate 6 hours prior to your scheduled arrival to the endoscopy unit.     STEP TWO (If you were told to arrive for your colonoscopy after 11 AM)     At 6 AM on the day of the exam take 2 Dulcolax (Bisacodyl) tablets.     At 6 AM on the day of the exam start drinking an 8-ounce glass of Miralax and Gatorade mixture every 15 minutes until the pitcher is empty (about 4 glasses).     Take four Simethicone (Mylicon) gas relief tablets after the prep is finished     Drink 10 ounces of clear Magnesium Citrate 6 hours prior to your scheduled arrival to the endoscopy unit.       Day of exam: 9/10/2021    If you must take medicine, you may take it with sips of water. Do not take diabetes medicine by mouth until after your exam.     Drinking of liquids, including the colonoscopy prep solution, should not continue beyond 4 hours before the procedure.       Do not chew or swallow anything including water or gum for at least 4 hours before your     colonoscopy. This is a safety issue, and we may need to cancel your exam if you do not observe this policy.     If you have asthma: Bring your inhaler with you.     Please arrive with an adult who can take you home after the test: the medicine used will make you sleepy. If you do not have someone to drive you home, we may cancel your test.        Frequently Asked Questions:        Why should Miralax be mixed  with Gatorade? It is important that your body does not develop an imbalance of electrolytes with the large volume of fluid in this prep. Gatorade contains those electrolytes.     Why should the Miralax prep be taken in several steps? The stool is flushed out by a large wave of fluid going through the colon. Just sipping a large volume of the solution will not achieve the desired result. Studies have shown that two smaller waves (or more in some cases) are better than one large one.      Why are the second Miralax dose and Magnesium Citrate so close to the exam? The intestine continues to produce mucus and waste. Longer intervals between the prep and the exam can lead to less than desired results. However, the stomach must be empty at the time of the exam in order to allow safe sedation. Therefore, there should be nothing by mouth 4 hours before the exam is started.       Clear liquid diet      You may have:              Water, tea, coffee (no cream)            Soda pop, Gatorade (not red or purple)       Clear nutrition drinks (Enlive, Resource Breeze)       Jell-O, Popsicles (no milk or fruit pieces) or sorbet (not red or purple)       Fat-free soup broth or bouillon       Plain hard candy, such as clear life savers (not red or purple)       Clear juices and fruit-flavored drinks such as apple juice, white grape juice, Hi-C and Alejo-Aid      (not red or purple)      Do not have:       Milk or milk products such as ice cream, malts or shakes       Red or purple drinks of any kind such as cranberry juice or grape juice. Avoid red or purple Jell-O, Popsicles, Alejo-Aid, sorbet and candy       Juices with pulp such as orange, grapefruit, pineapple or tomato juice       Cream soups of any kind       Alcohol      Low-fiber Diet      You may have:      Starches: White bread, rolls, biscuits, croissants, [place] toast, white flour tortillas, waffles, pancakes, Nauruan toast; white rice, noodles, pasta, macaroni; cooked and  peeled potatoes; plain crackers, saltines; cooked farina or cream of rice; puffed rice, corn flakes, Rice Krispies, Special K     Vegetables: vegetable broths     Fruits and fruit juices: Strained fruit juice, canned fruit without seeds or skin (not pineapple), applesauce, pear sauce, ripe bananas, melons (not watermelon)   Milk products: Milk (plain or flavored), cheese, cottage cheese, yogurt (no berries), custard, ice cream      Proteins: Tender, well-cooked ground beef, lamb, veal, ham, pork, chicken, turkey, fish or organ meats; eggs; creamy peanut butter     Fats and condiments:  Margarine, butter, oils, mayonnaise, sour cream, salad dressing, plain gravy; spices, cooked herbs; sugar, clear jelly, honey, syrup     Snacks, sweets and drinks: Pretzels, hard candy; plain cakes and cookies (no nuts or seeds); gelatin, plain pudding, sherbet, Popsicles; coffee, tea, carbonated ( fizzy ) drinks         Do not have:   Starches: Breads or rolls that contain nuts, seeds or fruit; whole wheat or whole grain breads that contain more than 1 gram of fiber per slice; cornbread; corn or whole wheat tortillas; potatoes with skin; brown rice, wild rice, kasha (buckwheat)     Vegetables: Any raw or steamed vegetables; vegetables with seeds; corn in any form     Fruits and fruit juices: Prunes, prune juice, raisins and other dried fruits, berries and other fruits with seeds, canned pineapple     Milk products: Any yogurt with nuts, seeds or berries     Proteins: Tough, fibrous meats with gristle; cooked dried beans, peas or lentils; crunchy peanut butter     Fats and condiments: Pickles, olives, relish, horseradish; jam, marmalade, preserves     Snacks, sweets and drinks: Popcorn, nuts, seeds, granola, coconut, candies made with nuts or seeds; all desserts that contain nuts, seeds, raisins and other dried fruits, coconut, whole grains or bran.          Thank you for choosing [facility], for your procedure. If you are sent  a survey regarding your care, please take the time to complete the questionnaire.

## 2021-07-27 DIAGNOSIS — Z11.59 ENCOUNTER FOR SCREENING FOR OTHER VIRAL DISEASES: ICD-10-CM

## 2021-09-01 ENCOUNTER — TELEPHONE (OUTPATIENT)
Dept: GASTROENTEROLOGY | Facility: CLINIC | Age: 51
End: 2021-09-01

## 2021-09-01 NOTE — TELEPHONE ENCOUNTER
Instructions, policy, conscious sedation plan reviewed. Instructed patient to have someone stay with them for 6 hours post exam. [facility]

## 2021-09-07 ENCOUNTER — LAB (OUTPATIENT)
Dept: LAB | Facility: CLINIC | Age: 51
End: 2021-09-07
Attending: INTERNAL MEDICINE
Payer: COMMERCIAL

## 2021-09-07 DIAGNOSIS — Z12.11 SPECIAL SCREENING FOR MALIGNANT NEOPLASMS, COLON: ICD-10-CM

## 2021-09-07 DIAGNOSIS — Z11.59 ENCOUNTER FOR SCREENING FOR OTHER VIRAL DISEASES: ICD-10-CM

## 2021-09-07 LAB — PSA SERPL-MCNC: 1.77 UG/L (ref 0–3.5)

## 2021-09-07 PROCEDURE — 84153 ASSAY OF PSA TOTAL: CPT

## 2021-09-07 PROCEDURE — U0003 INFECTIOUS AGENT DETECTION BY NUCLEIC ACID (DNA OR RNA); SEVERE ACUTE RESPIRATORY SYNDROME CORONAVIRUS 2 (SARS-COV-2) (CORONAVIRUS DISEASE [COVID-19]), AMPLIFIED PROBE TECHNIQUE, MAKING USE OF HIGH THROUGHPUT TECHNOLOGIES AS DESCRIBED BY CMS-2020-01-R: HCPCS

## 2021-09-07 PROCEDURE — U0005 INFEC AGEN DETEC AMPLI PROBE: HCPCS

## 2021-09-07 PROCEDURE — 36415 COLL VENOUS BLD VENIPUNCTURE: CPT

## 2021-09-08 LAB — SARS-COV-2 RNA RESP QL NAA+PROBE: NEGATIVE

## 2021-09-10 ENCOUNTER — HOSPITAL ENCOUNTER (OUTPATIENT)
Facility: AMBULATORY SURGERY CENTER | Age: 51
Discharge: HOME | End: 2021-09-10
Attending: INTERNAL MEDICINE | Admitting: INTERNAL MEDICINE
Payer: COMMERCIAL

## 2021-09-10 VITALS
HEART RATE: 66 BPM | HEIGHT: 67 IN | DIASTOLIC BLOOD PRESSURE: 72 MMHG | WEIGHT: 180 LBS | RESPIRATION RATE: 18 BRPM | OXYGEN SATURATION: 99 % | SYSTOLIC BLOOD PRESSURE: 106 MMHG | TEMPERATURE: 98 F | BODY MASS INDEX: 28.25 KG/M2

## 2021-09-10 LAB — COLONOSCOPY: NORMAL

## 2021-09-10 PROCEDURE — 88305 TISSUE EXAM BY PATHOLOGIST: CPT | Mod: TC | Performed by: INTERNAL MEDICINE

## 2021-09-10 PROCEDURE — 45380 COLONOSCOPY AND BIOPSY: CPT | Mod: 33

## 2021-09-10 RX ORDER — SODIUM CHLORIDE, SODIUM LACTATE, POTASSIUM CHLORIDE, CALCIUM CHLORIDE 600; 310; 30; 20 MG/100ML; MG/100ML; MG/100ML; MG/100ML
INJECTION, SOLUTION INTRAVENOUS CONTINUOUS
Status: DISCONTINUED | OUTPATIENT
Start: 2021-09-10 | End: 2021-09-11 | Stop reason: HOSPADM

## 2021-09-10 RX ORDER — ONDANSETRON 2 MG/ML
4 INJECTION INTRAMUSCULAR; INTRAVENOUS
Status: DISCONTINUED | OUTPATIENT
Start: 2021-09-10 | End: 2021-09-11 | Stop reason: HOSPADM

## 2021-09-10 RX ORDER — LIDOCAINE 40 MG/G
CREAM TOPICAL
Status: DISCONTINUED | OUTPATIENT
Start: 2021-09-10 | End: 2021-09-11 | Stop reason: HOSPADM

## 2021-09-10 RX ORDER — SIMETHICONE
LIQUID (ML) MISCELLANEOUS PRN
Status: DISCONTINUED | OUTPATIENT
Start: 2021-09-10 | End: 2021-09-10 | Stop reason: HOSPADM

## 2021-09-10 RX ORDER — FENTANYL CITRATE 50 UG/ML
INJECTION, SOLUTION INTRAMUSCULAR; INTRAVENOUS PRN
Status: DISCONTINUED | OUTPATIENT
Start: 2021-09-10 | End: 2021-09-10 | Stop reason: HOSPADM

## 2021-09-10 ASSESSMENT — MIFFLIN-ST. JEOR: SCORE: 1625.1

## 2021-09-13 LAB
PATH REPORT.COMMENTS IMP SPEC: NORMAL
PATH REPORT.COMMENTS IMP SPEC: NORMAL
PATH REPORT.FINAL DX SPEC: NORMAL
PATH REPORT.GROSS SPEC: NORMAL
PATH REPORT.MICROSCOPIC SPEC OTHER STN: NORMAL
PATH REPORT.RELEVANT HX SPEC: NORMAL
PHOTO IMAGE: NORMAL

## 2021-09-13 PROCEDURE — 88305 TISSUE EXAM BY PATHOLOGIST: CPT | Mod: 26 | Performed by: PATHOLOGY

## 2021-10-16 ENCOUNTER — HEALTH MAINTENANCE LETTER (OUTPATIENT)
Age: 51
End: 2021-10-16

## 2021-12-30 PROBLEM — Z72.0 TOBACCO ABUSE: Status: RESOLVED | Noted: 2018-03-26 | Resolved: 2021-12-30

## 2022-01-03 ENCOUNTER — OFFICE VISIT (OUTPATIENT)
Dept: PEDIATRICS | Facility: CLINIC | Age: 52
End: 2022-01-03
Payer: COMMERCIAL

## 2022-01-03 VITALS
RESPIRATION RATE: 16 BRPM | SYSTOLIC BLOOD PRESSURE: 132 MMHG | WEIGHT: 183.7 LBS | HEART RATE: 64 BPM | BODY MASS INDEX: 27.84 KG/M2 | DIASTOLIC BLOOD PRESSURE: 84 MMHG | HEIGHT: 68 IN

## 2022-01-03 DIAGNOSIS — G89.29 CHRONIC BILATERAL THORACIC BACK PAIN: ICD-10-CM

## 2022-01-03 DIAGNOSIS — Z00.00 ROUTINE GENERAL MEDICAL EXAMINATION AT A HEALTH CARE FACILITY: Primary | ICD-10-CM

## 2022-01-03 DIAGNOSIS — Z15.09 BRCA1 GENE MUTATION POSITIVE IN MALE: ICD-10-CM

## 2022-01-03 DIAGNOSIS — Z15.03 BRCA1 GENE MUTATION POSITIVE IN MALE: ICD-10-CM

## 2022-01-03 DIAGNOSIS — B35.6 TINEA CRURIS: ICD-10-CM

## 2022-01-03 DIAGNOSIS — M54.6 CHRONIC BILATERAL THORACIC BACK PAIN: ICD-10-CM

## 2022-01-03 DIAGNOSIS — Z15.01 BRCA1 GENE MUTATION POSITIVE IN MALE: ICD-10-CM

## 2022-01-03 PROCEDURE — 99396 PREV VISIT EST AGE 40-64: CPT | Performed by: PEDIATRICS

## 2022-01-03 PROCEDURE — 99213 OFFICE O/P EST LOW 20 MIN: CPT | Mod: 25 | Performed by: PEDIATRICS

## 2022-01-03 RX ORDER — CICLOPIROX OLAMINE 7.7 MG/G
CREAM TOPICAL
Qty: 90 G | Refills: 0 | Status: SHIPPED | OUTPATIENT
Start: 2022-01-03

## 2022-01-03 ASSESSMENT — MIFFLIN-ST. JEOR: SCORE: 1657.76

## 2022-01-03 NOTE — PATIENT INSTRUCTIONS
Call [phone] to schedule a lab only (fasting appointment) at your convenience sometime in next couple of weeks.    Try over the counter SALONPAS patches for your back. I have referred you on the physical therapy. If still having pain in 6 weeks, please follow up and we can discuss imaging and referral to back specialist if needed.    Try the ciclopirox cream for your groin rash. I also referred you to dermatology for follow up on that.    I placed a specialty referral during today's visit for: [facility]. You should expect to hear from someone to schedule. If you are having issues with this, please message me through [employer] or call our clinic at [phone] (press option 2 to speak with someone from our [facility] team).    GENERAL INFORMATION AND HELPFUL NUMBERS:    If labs were ordered today, please go to the outpatient lab located in the same building. Once the results are back, we will notify you with results.    If imaging was ordered to be done today, please go to [facility] (located in the same building across our [facility]). Once the results are back, we will notify you with results.    If imaging was ordered to be done in the future at an [facility] facility, someone will call to schedule otherwise you may also call [phone] to schedule.    If a specialty referral was placed during today's visit, someone will call to schedule unless you were instructed to call yourself. If you are having issues with this, please message me through [employer] or call our clinic at [phone] (press option 2 to speak with someone from our [facility] team).    If a mammogram was ordered during today's visit, someone will call to schedule otherwise you may also call [phone] to schedule     If a heart ultrasound or stress test was ordered today, someone will call to schedule otherwise you may also call the main Cardiology clinic at [phone] to schedule.    If a bone density scan  was ordered today, someone will call to schedule otherwise you may also call [phone] to schedule.    If you have any questions or concerns after our visit today, please message me through [address] or call our clinic at [phone] (press option 2 to speak with someone from our [facility] team).              Preventive Health Recommendations    Male Ages 50 - 64    Yearly exam:             See your health care provider every year in order to  o   Review health changes.   o   Discuss preventive care.    o   Review your medicines if your doctor has prescribed any.     Have a cholesterol test every 5 years, or more frequently if you are at risk for high cholesterol/heart disease.     Have a diabetes test (fasting glucose) every three years. If you are at risk for diabetes, you should have this test more often.     Have a colonoscopy at age 50, or have a yearly FIT test (stool test). These exams will check for colon cancer.      Talk with your health care provider about whether or not a prostate cancer screening test (PSA) is right for you.    You should be tested each year for STDs (sexually transmitted diseases), if you re at risk.     Shots: Get a flu shot each year. Get a tetanus shot every 10 years.     Nutrition:    Eat at least 5 servings of fruits and vegetables daily.     Eat whole-grain bread, whole-wheat pasta and brown rice instead of white grains and rice.     Get adequate Calcium and Vitamin D.     Lifestyle    Exercise for at least 150 minutes a week (30 minutes a day, 5 days a week). This will help you control your weight and prevent disease.     Limit alcohol to one drink per day.     No smoking.     Wear sunscreen to prevent skin cancer.     See your dentist every six months for an exam and cleaning.     See your eye doctor every 1 to 2 years.

## 2022-01-03 NOTE — ASSESSMENT & PLAN NOTE
This is an ongoing issue since he was a very young adult.  He has tried over-the-counter drying powders and creams.  He would like to see a dermatologist.  I will give him topical ciclopirox to use and will refer him on to dermatology.  We discussed good hygiene as well such as keeping the area dry and well aerated.  Avoid formfitting clothing

## 2022-01-03 NOTE — PROGRESS NOTES
--discuss groin rash that he's had for a very long time--goes away and comes back; discuss pain in back    SUBJECTIVE:   CC: Mine Whitaker is an 51 year old male who presents for preventative health visit.       Patient has been advised of split billing requirements and indicates understanding: Yes     He is originally from [place], but moved here 6 years ago for more opportunities for his family. He is  and has a 5 year old son and 10 year daughter. 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 Went back to [place] this past summer to 2021 to visit.    He has jock's itch, something that he's had for a long time. Goes away when not sitting down all the time.    Gained some weight. No longer working out as much. It affects lower back. Has to lay back. Pain is so bad. Hurts when he straightens his back. Worse if he stands for a long time, or when walking. The other day went running for a long time. No red flags.    Female cousin had breast cancer in [place], tested positive for BRCA1. So he had the test done and he has BRCA1 mutation. Saw oncology.    No family history of pancreatic cancer. Maternal uncle had throat cancer.    Quit smoking 5-6 months. Previously was smoking 4 cigs/day or less x 10 years.      Healthy Habits:     Getting at least 3 servings of Calcium per day:  Yes    Bi-annual eye exam:  NO    Dental care twice a year:  NO    Sleep apnea or symptoms of sleep apnea:  None    Diet:  Regular (no restrictions)    Frequency of exercise:  1 day/week    Duration of exercise:  15-30 minutes    Taking medications regularly:  Yes    Medication side effects:  None    PHQ-2 Total Score: 0    Additional concerns today:  Yes      Today's PHQ-2 Score:   PHQ-2 ( 1999 Pfizer) 1/3/2022   Q1: Little interest or pleasure in doing things 0   Q2: Feeling down, depressed or hopeless 0   PHQ-2 Score 0   Q1: Little interest or pleasure in doing things Not at  "all   Q2: Feeling down, depressed or hopeless Not at all   PHQ-2 Score 0       Abuse: Current or Past(Physical, Sexual or Emotional)- No  Do you feel safe in your environment? Yes    Have you ever done Advance Care Planning? (For example, a Health Directive, POLST, or a discussion with a medical provider or your loved ones about your wishes): No, advance care planning information given to patient to review.  Patient plans to discuss their wishes with loved ones or provider.      Social History     Tobacco Use     Smoking status: Former Smoker     Packs/day: 0.00     Quit date: 2021     Years since quittin.5     Smokeless tobacco: Never Used     Tobacco comment: Previously was smoking 4 cigs/day or less x 10 years.   Substance Use Topics     Alcohol use: No     If you drink alcohol do you typically have >3 drinks per day or >7 drinks per week? Not applicable    Alcohol Use 1/3/2022   Prescreen: >3 drinks/day or >7 drinks/week? No   Prescreen: >3 drinks/day or >7 drinks/week? -       Last PSA:   PSA Tumor Marker   Date Value Ref Range Status   2021 1.77 0.00 - 3.50 ug/L Final       Reviewed orders with patient. Reviewed health maintenance and updated orders accordingly - Yes      Reviewed and updated as needed this visit by clinical staff  Tobacco  Allergies  Meds  Problems    Fam Hx         Reviewed and updated as needed this visit by Provider  Tobacco       Dallin Hx          Review of Systems  See above    OBJECTIVE:   /84 (BP Location: Right arm, Patient Position: Sitting, Cuff Size: Adult Regular)   Pulse 64   Resp 16   Ht 1.727 m (5' 8\")   Wt 83.3 kg (183 lb 11.2 oz)   BMI 27.93 kg/m      Physical Exam  GENERAL: healthy, alert and no distress  NECK: no adenopathy, no asymmetry, masses, or scars and thyroid normal to palpation  RESP: lungs clear to auscultation - no rales, rhonchi or wheezes  BREAST: normal without masses, tenderness or nipple discharge and no palpable axillary masses " or adenopathy  CV: regular rate and rhythm, normal S1 S2, no S3 or S4, no murmur, click or rub, no peripheral edema and peripheral pulses strong  ABDOMEN: soft, nontender, no hepatosplenomegaly, no masses and bowel sounds normal  MS: no gross musculoskeletal defects noted, no edema, non tender along actual palpation of the back  SKIN: erythema along the inner thigh folds between skin surfaces  NEURO: Normal strength and tone, mentation intact and speech normal  PSYCH: mentation appears normal, affect normal/bright      ASSESSMENT/PLAN:     Assessment & Plan   Problem List Items Addressed This Visit     BRCA1 gene mutation positive in male     Female cousin had breast cancer in [place], tested positive for BRCA1. So he had the test done and he has BRCA1 mutation. Saw oncology in 2021. He did get PSA in 7/2021 which was normal. There is no family history of pancreatic cancer to warrant pancreatic screening at this time, and there is no gynecomastia on exam to suggest getting a mammogram.  Discussed with the patient doing self breast exams and to alert the provider with any gynecomastia as he would then qualify for mammogram.         Chronic bilateral thoracic back pain     Suspect due to poor posture.  He reports that he gets pain with standing for long periods of time, gets worse when he leans forward.  Gets better when he lies down flat.  He is not having any red flag symptoms.  He is interested in physical therapy.  If physical therapy is not helpful in 6 weeks, he is to follow-up.  I also advised that he use of over-the-counter Salonpas patches.  There are some components that would fit with possibly spinal stenosis, but others that do not.  He does not have any sciatica.  If in 6 weeks, he is not improving, consider imaging and referral to back specialist         Relevant Orders    Physical Therapy Referral    Tinea cruris     This is an ongoing issue since he was a very young adult.  He has tried over-the-counter  "drying powders and creams.  He would like to see a dermatologist.  I will give him topical ciclopirox to use and will refer him on to dermatology.  We discussed good hygiene as well such as keeping the area dry and well aerated.  Avoid formfitting clothing         Relevant Medications    ciclopirox (LOPROX) 0.77 % cream    Other Relevant Orders    Adult Dermatology Referral      Other Visit Diagnoses     Routine general medical examination at a health care facility    -  Primary    Relevant Orders    GLUCOSE    Lipid panel reflex to direct LDL Fasting           Ordering of each unique test  Prescription drug management       BMI:   Estimated body mass index is 27.93 kg/m  as calculated from the following:    Height as of this encounter: 1.727 m (5' 8\").    Weight as of this encounter: 83.3 kg (183 lb 11.2 oz).     He would like to return for fasting lab appointment.    Return in about 6 weeks (around 2/14/2022), or if symptoms worsen or fail to improve, for Follow up.    Marla Campos MD  [facility]    Patient has been advised of split billing requirements and indicates understanding: per MA/  COUNSELING:   Reviewed preventive health counseling, as reflected in patient instructions    Estimated body mass index is 27.93 kg/m  as calculated from the following:    Height as of this encounter: 1.727 m (5' 8\").    Weight as of this encounter: 83.3 kg (183 lb 11.2 oz).     Weight management plan: Discussed healthy diet and exercise guidelines    He reports that he quit smoking about 7 months ago. He smoked 0.00 packs per day. He has never used smokeless tobacco.  Tobacco Cessation Action Plan:   Patient already quit      Counseling Resources:  ATP IV Guidelines  Pooled Cohorts Equation Calculator  FRAX Risk Assessment  ICSI Preventive Guidelines  Dietary Guidelines for Americans, 2010  USDA's MyPlate  ASA Prophylaxis  Lung CA Screening    Marla Campos MD  [facility] " [place]

## 2022-01-03 NOTE — ASSESSMENT & PLAN NOTE
Suspect due to poor posture.  He reports that he gets pain with standing for long periods of time, gets worse when he leans forward.  Gets better when he lies down flat.  He is not having any red flag symptoms.  He is interested in physical therapy.  If physical therapy is not helpful in 6 weeks, he is to follow-up.  I also advised that he use of over-the-counter Salonpas patches.  There are some components that would fit with possibly spinal stenosis, but others that do not.  He does not have any sciatica.  If in 6 weeks, he is not improving, consider imaging and referral to back specialist

## 2022-01-03 NOTE — ASSESSMENT & PLAN NOTE
Female cousin had breast cancer in [place], tested positive for BRCA1. So he had the test done and he has BRCA1 mutation. Saw oncology in 2021. He did get PSA in 7/2021 which was normal. There is no family history of pancreatic cancer to warrant pancreatic screening at this time, and there is no gynecomastia on exam to suggest getting a mammogram.  Discussed with the patient doing self breast exams and to alert the provider with any gynecomastia as he would then qualify for mammogram.

## 2022-01-26 ENCOUNTER — THERAPY VISIT (OUTPATIENT)
Dept: PHYSICAL THERAPY | Facility: CLINIC | Age: 52
End: 2022-01-26
Attending: PEDIATRICS
Payer: COMMERCIAL

## 2022-01-26 DIAGNOSIS — M54.6 CHRONIC BILATERAL THORACIC BACK PAIN: ICD-10-CM

## 2022-01-26 DIAGNOSIS — G89.29 CHRONIC BILATERAL THORACIC BACK PAIN: ICD-10-CM

## 2022-01-26 PROCEDURE — 97140 MANUAL THERAPY 1/> REGIONS: CPT | Mod: GP | Performed by: PHYSICAL THERAPIST

## 2022-01-26 PROCEDURE — 97110 THERAPEUTIC EXERCISES: CPT | Mod: GP | Performed by: PHYSICAL THERAPIST

## 2022-01-26 PROCEDURE — 97161 PT EVAL LOW COMPLEX 20 MIN: CPT | Mod: GP | Performed by: PHYSICAL THERAPIST

## 2022-01-26 NOTE — PROGRESS NOTES
Physical Therapy Initial Evaluation  Subjective:  The history is provided by the patient.   Patient Health History  Mine Whitaker being seen for back pain, postural scoliosis .     Problem began: 1/1/2015.   Problem occurred: work posture    Pain is reported as 8/10 on pain scale.  General health as reported by patient is excellent.  Pertinent medical history includes: none.   Red flags:  None as reported by patient.     Surgeries include:  Orthopedic surgery. Other surgery history details: achilles repair  .    Current medications:  None.    Current occupation is computer, teaching .   Primary job tasks include:  Prolonged standing and prolonged sitting.                  Therapist Generated HPI Evaluation  Problem details: Pt reports he has daily recurrance of thoracic back pain, mostly on his Rt side. It comes when stnding too long, with bending, forward reaching. .         Type of problem:  Lumbar, thoracic and sacroiliac.    This is a chronic condition.  Condition occurred with:  Repetition/overuse and insidious onset.  Where condition occurred: at home.  Patient reports pain:  Mid thoracic spine, lower thoracic spine, upper lumbar spine and SI joint right.  Pain is described as aching and stabbing and is intermittent.  Pain radiates to:  No radiation. Pain is worse in the P.M..  Since onset symptoms are unchanged.  Associated symptoms:  Loss of motion/stiffness. Symptoms are exacerbated by bending, lifting, standing and walking  and relieved by rest.      Restrictions due to condition include:  Working in normal job without restrictions.  Barriers include:  None as reported by patient.                        Objective:  System         Lumbar/SI Evaluation  ROM:    AROM Lumbar:   Flexion:        Min, mod loss at TS   Ext:                    Mjr loss TS    Side Bend:        Left:     Right:   Rotation:           Left:     Right:   Side Glide:        Left:     Right:           Lumbar Myotomes:  not  assessed            Lumbar DTR's:  not assessed      Cord Signs:  not assessed    Lumbar Dermtomes:  not assessed                Neural Tension/Mobility:  Lumbar:  Normal        Lumbar Palpation:    Tenderness present at Left:    Quadratus Lumborum; Erector Spinae and Vertebral  Tenderness present at Right: Quadratus Lumborum; Erector Spinae and Vertebral        SI joint/Sacrum:          Left negative at:    Ilium Ventromedial  Right positive at:    Ilium Ventromedial  Sacral conclusion left:  Elevated pubic sym  Sacral conclusion right:  Posterior inominate, locked, upslip, sacral torsion, descended pubic sym and inflare                                             General     ROS    Assessment/Plan:    Patient is a 51 year old male with thoracic and lumbar complaints.    Patient has the following significant findings with corresponding treatment plan.                Diagnosis 1:  Thoracic pain   Pain -  manual therapy, self management, directional preference exercise and home program  Decreased ROM/flexibility - manual therapy, therapeutic exercise and home program  Decreased joint mobility - manual therapy and therapeutic exercise  Impaired muscle performance - neuro re-education  Decreased function - therapeutic activities    Therapy Evaluation Codes:   1) History comprised of:   Personal factors that impact the plan of care:      Coping style, Overall behavior pattern and Past/current experiences.    Comorbidity factors that impact the plan of care are:      None.     Medications impacting care: Pain.  2) Examination of Body Systems comprised of:   Body structures and functions that impact the plan of care:      Lumbar spine and Thoracic Spine.   Activity limitations that impact the plan of care are:      Bending, Reading/Computer work, Running and Standing.  3) Clinical presentation characteristics are:   Stable/Uncomplicated.  4) Decision-Making    Low complexity using standardized patient assessment  instrument and/or measureable assessment of functional outcome.  Cumulative Therapy Evaluation is: Low complexity.    Previous and current functional limitations:  (See Goal Flow Sheet for this information)    Short term and Long term goals: (See Goal Flow Sheet for this information)     Communication ability:  Patient appears to be able to clearly communicate and understand verbal and written communication and follow directions correctly.  Treatment Explanation - The following has been discussed with the patient:   RX ordered/plan of care  Anticipated outcomes  Possible risks and side effects  This patient would benefit from PT intervention to resume normal activities.   Rehab potential is good.    Frequency:  1 X week, once daily  Duration:  for 8 weeks  Discharge Plan:  Achieve all LTG.  Independent in home treatment program.  Reach maximal therapeutic benefit.    Please refer to the daily flowsheet for treatment today, total treatment time and time spent performing 1:1 timed codes.

## 2022-01-26 NOTE — PROGRESS NOTES
[facility]    OUTPATIENT Physical Therapy ORTHOPEDIC EVALUATION  PLAN OF TREATMENT FOR OUTPATIENT REHABILITATION  (COMPLETE FOR INITIAL CLAIMS ONLY)  Patient's Last Name, First Name, M.I.  YOB: 1970  Mine Whitaker       Provider s Name:  [facility]   Medical Record No.  [id number]   Start of Care Date:  01/26/22   Onset Date:       Type:     _X__PT   ___OT Medical Diagnosis:    Encounter Diagnosis   Name Primary?     Chronic bilateral thoracic back pain         Treatment Diagnosis:  thoracic pain         Goals:     01/26/22 0500   Body Part   Goals listed below are for Thoracic pain    Goal #1   Goal #1 standing   Previous Functional Level No restrictions   Current Functional Level Minutes patient can stand   Performance level 30' 6/10    STG Target Performance Minutes patient will be able to stand   Performance level 30' 2/10    Rationale for safe household ambulation;for return to work duties   Due date 02/26/22   LTG Target Performance Minutes patient will be able to stand   Performance Level +60 no pain walk or stnd    Rationale for housekeeping tasks such as vacuuming, bed making, mowing;for meal preparation;for return to work duties   Due date 03/26/22       Therapy Frequency:  1x per wk   Predicted Duration of Therapy Intervention:  8 weeks     Michael Fairchild, PT                 I CERTIFY THE NEED FOR THESE SERVICES FURNISHED UNDER        THIS PLAN OF TREATMENT AND WHILE UNDER MY CARE     (Physician attestation of this document indicates review and certification of the therapy plan).                       Certification Date From:  01/26/22   Certification Date To:  03/25/22    Referring Provider:  Marla Campos    Initial Assessment        See Epic Evaluation SOC Date: 01/26/22

## 2022-03-04 ENCOUNTER — THERAPY VISIT (OUTPATIENT)
Dept: PHYSICAL THERAPY | Facility: CLINIC | Age: 52
End: 2022-03-04
Payer: COMMERCIAL

## 2022-03-04 DIAGNOSIS — M54.6 CHRONIC MIDLINE THORACIC BACK PAIN: Primary | ICD-10-CM

## 2022-03-04 DIAGNOSIS — G89.29 CHRONIC MIDLINE THORACIC BACK PAIN: Primary | ICD-10-CM

## 2022-03-04 PROBLEM — M62.81 GENERALIZED MUSCLE WEAKNESS: Status: RESOLVED | Noted: 2022-03-04 | Resolved: 2022-03-04

## 2022-03-04 PROBLEM — M62.81 GENERALIZED MUSCLE WEAKNESS: Status: ACTIVE | Noted: 2022-03-04

## 2022-03-04 PROCEDURE — 97110 THERAPEUTIC EXERCISES: CPT | Mod: GP | Performed by: PHYSICAL THERAPIST

## 2022-03-04 PROCEDURE — 97140 MANUAL THERAPY 1/> REGIONS: CPT | Mod: GP | Performed by: PHYSICAL THERAPIST

## 2022-03-25 ENCOUNTER — THERAPY VISIT (OUTPATIENT)
Dept: PHYSICAL THERAPY | Facility: CLINIC | Age: 52
End: 2022-03-25
Payer: COMMERCIAL

## 2022-03-25 DIAGNOSIS — G89.29 CHRONIC BILATERAL THORACIC BACK PAIN: Primary | ICD-10-CM

## 2022-03-25 DIAGNOSIS — M54.6 CHRONIC BILATERAL THORACIC BACK PAIN: Primary | ICD-10-CM

## 2022-03-25 PROCEDURE — 97110 THERAPEUTIC EXERCISES: CPT | Mod: GP | Performed by: PHYSICAL THERAPIST

## 2022-03-25 PROCEDURE — 97140 MANUAL THERAPY 1/> REGIONS: CPT | Mod: GP | Performed by: PHYSICAL THERAPIST

## 2022-03-25 NOTE — PROGRESS NOTES
DISCHARGE REPORT    Progress reporting period is from 1.26 to 3.25.22.       SUBJECTIVE  Subjective changes noted by patient:  .  Subjective: been skiing no pain      Current pain level is 0/10 Current Pain level: 0/10.     Previous pain level was  8/10 Initial Pain level: 8/10.   Changes in function:  Yes (See Goal flowsheet attached for changes in current functional level)  Adverse reaction to treatment or activity: None    OBJECTIVE  Changes noted in objective findings:  Yes, pt has respnded well, functional ROM and no pain prod'd   Objective: +left ascend pub, indep HEP, functiona TS ROM      ASSESSMENT/PLAN  Updated problem list and treatment plan: Diagnosis 1:  Thoracic pain     STG/LTGs have been met or progress has been made towards goals:  Yes (See Goal flow sheet completed today.)  Assessment of Progress: The patient has met all of their long term goals.  Self Management Plans:  Patient is independent in a home treatment program.  Patient is independent in self management of symptoms.    Mine continues to require the following intervention to meet STG and LTG's:  PT intervention is no longer required to meet STG/LTG.    Recommendations:  This patient is ready to be discharged from therapy and continue their home treatment program.    Please refer to the daily flowsheet for treatment today, total treatment time and time spent performing 1:1 timed codes.

## 2022-09-25 ENCOUNTER — HEALTH MAINTENANCE LETTER (OUTPATIENT)
Age: 52
End: 2022-09-25

## 2023-02-04 ENCOUNTER — HEALTH MAINTENANCE LETTER (OUTPATIENT)
Age: 53
End: 2023-02-04

## 2023-11-14 ENCOUNTER — OFFICE VISIT (OUTPATIENT)
Dept: FAMILY MEDICINE | Facility: CLINIC | Age: 53
End: 2023-11-14
Payer: COMMERCIAL

## 2023-11-14 VITALS
DIASTOLIC BLOOD PRESSURE: 81 MMHG | TEMPERATURE: 97.7 F | BODY MASS INDEX: 28.31 KG/M2 | WEIGHT: 186.8 LBS | SYSTOLIC BLOOD PRESSURE: 112 MMHG | HEART RATE: 67 BPM | RESPIRATION RATE: 16 BRPM | OXYGEN SATURATION: 100 % | HEIGHT: 68 IN

## 2023-11-14 DIAGNOSIS — Z15.03 BRCA1 GENE MUTATION POSITIVE IN MALE: ICD-10-CM

## 2023-11-14 DIAGNOSIS — Z15.01 BRCA1 GENE MUTATION POSITIVE IN MALE: ICD-10-CM

## 2023-11-14 DIAGNOSIS — Z15.09 BRCA1 GENE MUTATION POSITIVE IN MALE: ICD-10-CM

## 2023-11-14 DIAGNOSIS — Z00.00 ROUTINE GENERAL MEDICAL EXAMINATION AT A HEALTH CARE FACILITY: Primary | ICD-10-CM

## 2023-11-14 DIAGNOSIS — E66.3 OVERWEIGHT: ICD-10-CM

## 2023-11-14 LAB
ALBUMIN SERPL BCG-MCNC: 4.4 G/DL (ref 3.5–5.2)
ALP SERPL-CCNC: 51 U/L (ref 40–150)
ALT SERPL W P-5'-P-CCNC: 23 U/L (ref 0–70)
ANION GAP SERPL CALCULATED.3IONS-SCNC: 12 MMOL/L (ref 7–15)
AST SERPL W P-5'-P-CCNC: 20 U/L (ref 0–45)
BILIRUB SERPL-MCNC: 0.5 MG/DL
BUN SERPL-MCNC: 17.9 MG/DL (ref 6–20)
CALCIUM SERPL-MCNC: 9 MG/DL (ref 8.6–10)
CHLORIDE SERPL-SCNC: 104 MMOL/L (ref 98–107)
CHOLEST SERPL-MCNC: 189 MG/DL
CREAT SERPL-MCNC: 1.09 MG/DL (ref 0.67–1.17)
DEPRECATED HCO3 PLAS-SCNC: 24 MMOL/L (ref 22–29)
EGFRCR SERPLBLD CKD-EPI 2021: 81 ML/MIN/1.73M2
ERYTHROCYTE [DISTWIDTH] IN BLOOD BY AUTOMATED COUNT: 11.5 % (ref 10–15)
GLUCOSE SERPL-MCNC: 160 MG/DL (ref 70–99)
HBA1C MFR BLD: 5.3 % (ref 0–5.6)
HCT VFR BLD AUTO: 43.9 % (ref 40–53)
HDLC SERPL-MCNC: 43 MG/DL
HGB BLD-MCNC: 15.4 G/DL (ref 13.3–17.7)
LDLC SERPL CALC-MCNC: 114 MG/DL
MCH RBC QN AUTO: 30 PG (ref 26.5–33)
MCHC RBC AUTO-ENTMCNC: 35.1 G/DL (ref 31.5–36.5)
MCV RBC AUTO: 86 FL (ref 78–100)
NONHDLC SERPL-MCNC: 146 MG/DL
PLATELET # BLD AUTO: 165 10E3/UL (ref 150–450)
POTASSIUM SERPL-SCNC: 4.2 MMOL/L (ref 3.4–5.3)
PROT SERPL-MCNC: 6.8 G/DL (ref 6.4–8.3)
PSA SERPL DL<=0.01 NG/ML-MCNC: 1.91 NG/ML (ref 0–3.5)
RBC # BLD AUTO: 5.13 10E6/UL (ref 4.4–5.9)
SODIUM SERPL-SCNC: 140 MMOL/L (ref 135–145)
TRIGL SERPL-MCNC: 158 MG/DL
TSH SERPL DL<=0.005 MIU/L-ACNC: 1.84 UIU/ML (ref 0.3–4.2)
WBC # BLD AUTO: 5.3 10E3/UL (ref 4–11)

## 2023-11-14 PROCEDURE — 80053 COMPREHEN METABOLIC PANEL: CPT | Performed by: PHYSICIAN ASSISTANT

## 2023-11-14 PROCEDURE — 80061 LIPID PANEL: CPT | Performed by: PHYSICIAN ASSISTANT

## 2023-11-14 PROCEDURE — 99396 PREV VISIT EST AGE 40-64: CPT | Performed by: PHYSICIAN ASSISTANT

## 2023-11-14 PROCEDURE — 85027 COMPLETE CBC AUTOMATED: CPT | Performed by: PHYSICIAN ASSISTANT

## 2023-11-14 PROCEDURE — 36415 COLL VENOUS BLD VENIPUNCTURE: CPT | Performed by: PHYSICIAN ASSISTANT

## 2023-11-14 PROCEDURE — 83036 HEMOGLOBIN GLYCOSYLATED A1C: CPT | Performed by: PHYSICIAN ASSISTANT

## 2023-11-14 PROCEDURE — G0103 PSA SCREENING: HCPCS | Performed by: PHYSICIAN ASSISTANT

## 2023-11-14 PROCEDURE — 84443 ASSAY THYROID STIM HORMONE: CPT | Performed by: PHYSICIAN ASSISTANT

## 2023-11-14 ASSESSMENT — ENCOUNTER SYMPTOMS
HEARTBURN: 0
EYE PAIN: 0
HEADACHES: 0
DIZZINESS: 0
FREQUENCY: 0
SORE THROAT: 0
CONSTIPATION: 0
ABDOMINAL PAIN: 0
WEAKNESS: 0
SHORTNESS OF BREATH: 0
DIARRHEA: 0
HEMATOCHEZIA: 0
CHILLS: 0
NAUSEA: 0
COUGH: 0
FEVER: 0
ARTHRALGIAS: 0
HEMATURIA: 0
PARESTHESIAS: 0
PALPITATIONS: 0
JOINT SWELLING: 0
DYSURIA: 0
NERVOUS/ANXIOUS: 0
MYALGIAS: 0

## 2023-11-14 NOTE — PROGRESS NOTES
SUBJECTIVE:   CC: Mine is an 53 year old who presents for preventative health visit.       2023     7:24 AM   Additional Questions   Roomed by JORDANA Yee CMA([facility])       Healthy Habits:     Getting at least 3 servings of Calcium per day:  Yes    Bi-annual eye exam:  NO    Dental care twice a year:  Yes    Sleep apnea or symptoms of sleep apnea:  None    Diet:  Regular (no restrictions)    Frequency of exercise:  1 day/week    Duration of exercise:  Less than 15 minutes    Taking medications regularly:  Yes    Medication side effects:  None    Additional concerns today:  No      Today's PHQ-2 Score:       2023     7:20 AM   PHQ-2 (  [employer])   Q1: Little interest or pleasure in doing things 0   Q2: Feeling down, depressed or hopeless 0   PHQ-2 Score 0   Q1: Little interest or pleasure in doing things Not at all   Q2: Feeling down, depressed or hopeless Not at all   PHQ-2 Score 0         Social History     Tobacco Use    Smoking status: Former     Packs/day: 0     Types: Cigarettes     Quit date: 2021     Years since quittin.4    Smokeless tobacco: Never    Tobacco comments:     Previously was smoking 4 cigs/day or less x 10 years.   Substance Use Topics    Alcohol use: No             2023     7:20 AM   Alcohol Use   Prescreen: >3 drinks/day or >7 drinks/week? No       Last PSA:   PSA Tumor Marker   Date Value Ref Range Status   2021 1.77 0.00 - 3.50 ug/L Final       Reviewed orders with patient. Reviewed health maintenance and updated orders accordingly - Yes  Patient Active Problem List   Diagnosis    BRCA1 gene mutation positive in male    Tinea cruris    Overweight     Past Surgical History:   Procedure Laterality Date    COLONOSCOPY N/A 9/10/2021    Procedure: COLONOSCOPY, WITH POLYPECTOMY AND BIOPSY;  Surgeon: John Paul Rodriguez MD;  Location: [facility] [place]       Social History     Tobacco Use    Smoking status: Former     Packs/day: 0     Types: Cigarettes     Quit date: 2021      Years since quittin.4    Smokeless tobacco: Never    Tobacco comments:     Previously was smoking 4 cigs/day or less x 10 years.   Substance Use Topics    Alcohol use: No     Family History   Problem Relation Age of Onset    Breast Cancer Mother 56        passed at 61    Ovarian Cancer Mother 42    Breast Cancer Maternal Grandmother         unknown age    Coronary Artery Disease Maternal Grandfather     Coronary Artery Disease Paternal Grandfather     Other - See Comments Maternal Aunt         BRCA1 positive    Coronary Artery Disease Maternal Uncle         late 50's; smoker and ETOH    Throat cancer Maternal Uncle 56        smoker, drinker    Breast Cancer Cousin 42        BRCA1 positive; Arleen's daughter    Lymphoma Cousin 49         Current Outpatient Medications   Medication Sig Dispense Refill    ciclopirox (LOPROX) 0.77 % cream Apply twice a day to rash in groin for 4 weeks. 90 g 0    vitamin B-Complex Take 1 tablet by mouth daily       No Known Allergies    Reviewed and updated as needed this visit by clinical staff   Tobacco  Allergies               Reviewed and updated as needed this visit by Provider                 Review of Systems   Constitutional:  Negative for chills and fever.   HENT:  Negative for congestion, ear pain, hearing loss and sore throat.    Eyes:  Negative for pain and visual disturbance.   Respiratory:  Negative for cough and shortness of breath.    Cardiovascular:  Negative for chest pain, palpitations and peripheral edema.   Gastrointestinal:  Negative for abdominal pain, constipation, diarrhea, heartburn, hematochezia and nausea.   Genitourinary:  Negative for dysuria, frequency, genital sores, hematuria, impotence, penile discharge and urgency.   Musculoskeletal:  Negative for arthralgias, joint swelling and myalgias.   Skin:  Negative for rash.   Neurological:  Negative for dizziness, weakness, headaches and paresthesias.   Psychiatric/Behavioral:  Negative for mood changes.  "The patient is not nervous/anxious.        OBJECTIVE:   /81   Pulse 67   Temp 97.7  F (36.5  C) (Oral)   Resp 16   Ht 1.734 m (5' 8.25\")   Wt 84.7 kg (186 lb 12.8 oz)   SpO2 100%   BMI 28.20 kg/m      Physical Exam  GENERAL: healthy, alert and no distress  EYES: Eyes grossly normal to inspection, PERRL and conjunctivae and sclerae normal  HENT: ear canals and TM's normal, nose and mouth without ulcers or lesions  NECK: no adenopathy, no asymmetry, masses, or scars and thyroid normal to palpation  RESP: lungs clear to auscultation - no rales, rhonchi or wheezes  CV: regular rate and rhythm, normal S1 S2, no S3 or S4, no murmur, click or rub, no peripheral edema and peripheral pulses strong  ABDOMEN: soft, nontender, no hepatosplenomegaly, no masses and bowel sounds normal  MS: no gross musculoskeletal defects noted, no edema  SKIN: no suspicious lesions or rashes  NEURO: Normal strength and tone, mentation intact and speech normal  PSYCH: mentation appears normal, affect normal/bright    Diagnostic Test Results:  none     ASSESSMENT/PLAN:   Mine was seen today for physical.    Diagnoses and all orders for this visit:    Routine general medical examination at a health care facility  -     CBC with platelets; Future  -     Comprehensive metabolic panel; Future  -     Hemoglobin A1c; Future  -     Lipid panel reflex to direct LDL Fasting; Future  -     TSH with free T4 reflex; Future  -     Prostate Specific Antigen Screen; Future  -     CBC with platelets  -     Comprehensive metabolic panel  -     Hemoglobin A1c  -     Lipid panel reflex to direct LDL Fasting  -     TSH with free T4 reflex  -     Prostate Specific Antigen Screen  Labs updated today.  Colonoscopy- UTD.  PSA screening done today.  Vaccines- declined, encouraged Shingrix at Pharmacy.    Overweight  Discussed working on lifestyle changes.    BRCA1 gene mutation positive in male  Reviewed increased risks for breast and prostate cancer.  " "Appropriate screening discussed.    Other orders  -     PRIMARY CARE FOLLOW-UP SCHEDULING; Future        Patient has been advised of split billing requirements and indicates understanding: Yes      COUNSELING:   Reviewed preventive health counseling, as reflected in patient instructions      BMI:   Estimated body mass index is 28.2 kg/m  as calculated from the following:    Height as of this encounter: 1.734 m (5' 8.25\").    Weight as of this encounter: 84.7 kg (186 lb 12.8 oz).   Weight management plan: Discussed healthy diet and exercise guidelines      He reports that he quit smoking about 2 years ago. He has never used smokeless tobacco.            Fe Garland PA-C  [facility]  "

## 2024-08-20 ENCOUNTER — TRANSFERRED RECORDS (OUTPATIENT)
Dept: HEALTH INFORMATION MANAGEMENT | Facility: CLINIC | Age: 54
End: 2024-08-20

## 2024-10-15 ENCOUNTER — PATIENT OUTREACH (OUTPATIENT)
Dept: CARE COORDINATION | Facility: CLINIC | Age: 54
End: 2024-10-15
Payer: COMMERCIAL

## 2024-11-08 SDOH — HEALTH STABILITY: PHYSICAL HEALTH: ON AVERAGE, HOW MANY DAYS PER WEEK DO YOU ENGAGE IN MODERATE TO STRENUOUS EXERCISE (LIKE A BRISK WALK)?: 1 DAY

## 2024-11-08 SDOH — HEALTH STABILITY: PHYSICAL HEALTH: ON AVERAGE, HOW MANY MINUTES DO YOU ENGAGE IN EXERCISE AT THIS LEVEL?: 40 MIN

## 2024-11-08 ASSESSMENT — SOCIAL DETERMINANTS OF HEALTH (SDOH): HOW OFTEN DO YOU GET TOGETHER WITH FRIENDS OR RELATIVES?: THREE TIMES A WEEK

## 2024-11-13 ENCOUNTER — OFFICE VISIT (OUTPATIENT)
Dept: FAMILY MEDICINE | Facility: CLINIC | Age: 54
End: 2024-11-13
Payer: COMMERCIAL

## 2024-11-13 VITALS
HEART RATE: 67 BPM | DIASTOLIC BLOOD PRESSURE: 78 MMHG | RESPIRATION RATE: 14 BRPM | HEIGHT: 68 IN | SYSTOLIC BLOOD PRESSURE: 114 MMHG | WEIGHT: 185.2 LBS | BODY MASS INDEX: 28.07 KG/M2 | TEMPERATURE: 98.4 F | OXYGEN SATURATION: 99 %

## 2024-11-13 DIAGNOSIS — Z12.5 SCREENING FOR PROSTATE CANCER: ICD-10-CM

## 2024-11-13 DIAGNOSIS — Z86.0100 HISTORY OF COLONIC POLYPS: ICD-10-CM

## 2024-11-13 DIAGNOSIS — Z15.03 BRCA1 GENE MUTATION POSITIVE IN MALE: ICD-10-CM

## 2024-11-13 DIAGNOSIS — E66.3 OVERWEIGHT: ICD-10-CM

## 2024-11-13 DIAGNOSIS — R07.9 CHEST PAIN, UNSPECIFIED TYPE: ICD-10-CM

## 2024-11-13 DIAGNOSIS — M54.2 CERVICALGIA: ICD-10-CM

## 2024-11-13 DIAGNOSIS — Z15.09 BRCA1 GENE MUTATION POSITIVE IN MALE: ICD-10-CM

## 2024-11-13 DIAGNOSIS — Z15.01 BRCA1 GENE MUTATION POSITIVE IN MALE: ICD-10-CM

## 2024-11-13 DIAGNOSIS — Z00.00 ROUTINE GENERAL MEDICAL EXAMINATION AT A HEALTH CARE FACILITY: Primary | ICD-10-CM

## 2024-11-13 PROBLEM — B35.6 TINEA CRURIS: Status: RESOLVED | Noted: 2022-01-03 | Resolved: 2024-11-13

## 2024-11-13 LAB
ALBUMIN SERPL BCG-MCNC: 4.5 G/DL (ref 3.5–5.2)
ALP SERPL-CCNC: 52 U/L (ref 40–150)
ALT SERPL W P-5'-P-CCNC: 26 U/L (ref 0–70)
ANION GAP SERPL CALCULATED.3IONS-SCNC: 13 MMOL/L (ref 7–15)
AST SERPL W P-5'-P-CCNC: 18 U/L (ref 0–45)
ATRIAL RATE - MUSE: 60 BPM
BILIRUB SERPL-MCNC: 0.6 MG/DL
BUN SERPL-MCNC: 24.3 MG/DL (ref 6–20)
CALCIUM SERPL-MCNC: 9.4 MG/DL (ref 8.8–10.4)
CHLORIDE SERPL-SCNC: 105 MMOL/L (ref 98–107)
CHOLEST SERPL-MCNC: 206 MG/DL
CREAT SERPL-MCNC: 1.09 MG/DL (ref 0.67–1.17)
DIASTOLIC BLOOD PRESSURE - MUSE: NORMAL MMHG
EGFRCR SERPLBLD CKD-EPI 2021: 80 ML/MIN/1.73M2
ERYTHROCYTE [DISTWIDTH] IN BLOOD BY AUTOMATED COUNT: 11.6 % (ref 10–15)
EST. AVERAGE GLUCOSE BLD GHB EST-MCNC: 105 MG/DL
FASTING STATUS PATIENT QL REPORTED: YES
FASTING STATUS PATIENT QL REPORTED: YES
GLUCOSE SERPL-MCNC: 145 MG/DL (ref 70–99)
HBA1C MFR BLD: 5.3 % (ref 0–5.6)
HCO3 SERPL-SCNC: 23 MMOL/L (ref 22–29)
HCT VFR BLD AUTO: 44.2 % (ref 40–53)
HDLC SERPL-MCNC: 43 MG/DL
HGB BLD-MCNC: 15.3 G/DL (ref 13.3–17.7)
INTERPRETATION ECG - MUSE: NORMAL
LDLC SERPL CALC-MCNC: 133 MG/DL
MCH RBC QN AUTO: 29.3 PG (ref 26.5–33)
MCHC RBC AUTO-ENTMCNC: 34.6 G/DL (ref 31.5–36.5)
MCV RBC AUTO: 85 FL (ref 78–100)
NONHDLC SERPL-MCNC: 163 MG/DL
P AXIS - MUSE: 3 DEGREES
PLATELET # BLD AUTO: 194 10E3/UL (ref 150–450)
POTASSIUM SERPL-SCNC: 3.9 MMOL/L (ref 3.4–5.3)
PR INTERVAL - MUSE: 142 MS
PROT SERPL-MCNC: 7.1 G/DL (ref 6.4–8.3)
PSA SERPL DL<=0.01 NG/ML-MCNC: 2.42 NG/ML (ref 0–3.5)
QRS DURATION - MUSE: 90 MS
QT - MUSE: 388 MS
QTC - MUSE: 388 MS
R AXIS - MUSE: 6 DEGREES
RBC # BLD AUTO: 5.23 10E6/UL (ref 4.4–5.9)
SODIUM SERPL-SCNC: 141 MMOL/L (ref 135–145)
SYSTOLIC BLOOD PRESSURE - MUSE: NORMAL MMHG
T AXIS - MUSE: 27 DEGREES
TRIGL SERPL-MCNC: 148 MG/DL
TSH SERPL DL<=0.005 MIU/L-ACNC: 1.46 UIU/ML (ref 0.3–4.2)
VENTRICULAR RATE- MUSE: 60 BPM
WBC # BLD AUTO: 5.3 10E3/UL (ref 4–11)

## 2024-11-13 PROCEDURE — 36415 COLL VENOUS BLD VENIPUNCTURE: CPT | Performed by: PHYSICIAN ASSISTANT

## 2024-11-13 PROCEDURE — 80053 COMPREHEN METABOLIC PANEL: CPT | Performed by: PHYSICIAN ASSISTANT

## 2024-11-13 PROCEDURE — 93005 ELECTROCARDIOGRAM TRACING: CPT | Performed by: PHYSICIAN ASSISTANT

## 2024-11-13 PROCEDURE — 84443 ASSAY THYROID STIM HORMONE: CPT | Performed by: PHYSICIAN ASSISTANT

## 2024-11-13 PROCEDURE — 99213 OFFICE O/P EST LOW 20 MIN: CPT | Mod: 25 | Performed by: PHYSICIAN ASSISTANT

## 2024-11-13 PROCEDURE — 83036 HEMOGLOBIN GLYCOSYLATED A1C: CPT | Performed by: PHYSICIAN ASSISTANT

## 2024-11-13 PROCEDURE — 80061 LIPID PANEL: CPT | Performed by: PHYSICIAN ASSISTANT

## 2024-11-13 PROCEDURE — 85027 COMPLETE CBC AUTOMATED: CPT | Performed by: PHYSICIAN ASSISTANT

## 2024-11-13 PROCEDURE — 93010 ELECTROCARDIOGRAM REPORT: CPT | Performed by: INTERNAL MEDICINE

## 2024-11-13 PROCEDURE — G0103 PSA SCREENING: HCPCS | Performed by: PHYSICIAN ASSISTANT

## 2024-11-13 PROCEDURE — 99396 PREV VISIT EST AGE 40-64: CPT | Performed by: PHYSICIAN ASSISTANT

## 2024-11-13 ASSESSMENT — ENCOUNTER SYMPTOMS: BACK PAIN: 1

## 2024-11-13 NOTE — PATIENT INSTRUCTIONS
Patient Education   Preventive Care Advice   This is general advice given by our system to help you stay healthy. However, your care team may have specific advice just for you. Please talk to your care team about your preventive care needs.  Nutrition  Eat 5 or more servings of fruits and vegetables each day.  Try wheat bread, brown rice and whole grain pasta (instead of white bread, rice, and pasta).  Get enough calcium and vitamin D. Check the label on foods and aim for 100% of the RDA (recommended daily allowance).  Lifestyle  Exercise at least 150 minutes each week  (30 minutes a day, 5 days a week).  Do muscle strengthening activities 2 days a week. These help control your weight and prevent disease.  No smoking.  Wear sunscreen to prevent skin cancer.  Have a dental exam and cleaning every 6 months.  Yearly exams  See your health care team every year to talk about:  Any changes in your health.  Any medicines your care team has prescribed.  Preventive care, family planning, and ways to prevent chronic diseases.  Shots (vaccines)   HPV shots (up to age 26), if you've never had them before.  Hepatitis B shots (up to age 59), if you've never had them before.  COVID-19 shot: Get this shot when it's due.  Flu shot: Get a flu shot every year.  Tetanus shot: Get a tetanus shot every 10 years.  Pneumococcal, hepatitis A, and RSV shots: Ask your care team if you need these based on your risk.  Shingles shot (for age 50 and up)  General health tests  Diabetes screening:  Starting at age 35, Get screened for diabetes at least every 3 years.  If you are younger than age 35, ask your care team if you should be screened for diabetes.  Cholesterol test: At age 39, start having a cholesterol test every 5 years, or more often if advised.  Bone density scan (DEXA): At age 50, ask your care team if you should have this scan for osteoporosis (brittle bones).  Hepatitis C: Get tested at least once in your life.  STIs (sexually  transmitted infections)  Before age 24: Ask your care team if you should be screened for STIs.  After age 24: Get screened for STIs if you're at risk. You are at risk for STIs (including HIV) if:  You are sexually active with more than one person.  You don't use condoms every time.  You or a partner was diagnosed with a sexually transmitted infection.  If you are at risk for HIV, ask about PrEP medicine to prevent HIV.  Get tested for HIV at least once in your life, whether you are at risk for HIV or not.  Cancer screening tests  Cervical cancer screening: If you have a cervix, begin getting regular cervical cancer screening tests starting at age 21.  Breast cancer scan (mammogram): If you've ever had breasts, begin having regular mammograms starting at age 40. This is a scan to check for breast cancer.  Colon cancer screening: It is important to start screening for colon cancer at age 45.  Have a colonoscopy test every 10 years (or more often if you're at risk) Or, ask your provider about stool tests like a FIT test every year or Cologuard test every 3 years.  To learn more about your testing options, visit:   .  For help making a decision, visit:   https://bit.ly/yz92553.  Prostate cancer screening test: If you have a prostate, ask your care team if a prostate cancer screening test (PSA) at age 55 is right for you.  Lung cancer screening: If you are a current or former smoker ages 50 to 80, ask your care team if ongoing lung cancer screenings are right for you.  For informational purposes only. Not to replace the advice of your health care provider. Copyright   2023 [facility]. All rights reserved. Clinically reviewed by the [facility]. [employer] 262024 - REV 01/24.  Preventing Falls: Care Instructions  Injuries and health problems such as trouble walking or poor eyesight can increase your risk of falling. So can some medicines. But there are things you can do to help  "prevent falls. You can exercise to get stronger. You can also arrange your home to make it safer.    Talk to your doctor about the medicines you take. Ask if any of them increase the risk of falls and whether they can be changed or stopped.   Try to exercise regularly. It can help improve your strength and balance. This can help lower your risk of falling.         Practice fall safety and prevention.   Wear low-heeled shoes that fit well and give your feet good support. Talk to your doctor if you have foot problems that make this hard.  Carry a cellphone or wear a medical alert device that you can use to call for help.  Use stepladders instead of chairs to reach high objects. Don't climb if you're at risk for falls. Ask for help, if needed.  Wear the correct eyeglasses, if you need them.        Make your home safer.   Remove rugs, cords, clutter, and furniture from walkways.  Keep your house well lit. Use night-lights in hallways and bathrooms.  Install and use sturdy handrails on stairways.  Wear nonskid footwear, even inside. Don't walk barefoot or in socks without shoes.        Be safe outside.   Use handrails, curb cuts, and ramps whenever possible.  Keep your hands free by using a shoulder bag or backpack.  Try to walk in well-lit areas. Watch out for uneven ground, changes in pavement, and debris.  Be careful in the winter. Walk on the grass or gravel when sidewalks are slippery. Use de-icer on steps and walkways. Add non-slip devices to shoes.    Put grab bars and nonskid mats in your shower or tub and near the toilet. Try to use a shower chair or bath bench when bathing.   Get into a tub or shower by putting in your weaker leg first. Get out with your strong side first. Have a phone or medical alert device in the bathroom with you.   Where can you learn more?  Go to https://www.[employer].net/patiented  Enter G117 in the search box to learn more about \"Preventing Falls: Care Instructions.\"  Current as of: " July 17, 2023  Content Version: 14.2 2024 [employer].   Care instructions adapted under license by your healthcare professional. If you have questions about a medical condition or this instruction, always ask your healthcare professional. [employer] Inc[employer] disclaims any warranty or liability for your use of this information.    Learning About Stress  What is stress?     Stress is your body's response to a hard situation. Your body can have a physical, emotional, or mental response. Stress is a fact of life for most people, and it affects everyone differently. What causes stress for you may not be stressful for someone else.  A lot of things can cause stress. You may feel stress when you go on a job interview, take a test, or run a race. This kind of short-term stress is normal and even useful. It can help you if you need to work hard or react quickly. For example, stress can help you finish an important job on time.  Long-term stress is caused by ongoing stressful situations or events. Examples of long-term stress include long-term health problems, ongoing problems at work, or conflicts in your family. Long-term stress can harm your health.  How does stress affect your health?  When you are stressed, your body responds as though you are in danger. It makes hormones that speed up your heart, make you breathe faster, and give you a burst of energy. This is called the fight-or-flight stress response. If the stress is over quickly, your body goes back to normal and no harm is done.  But if stress happens too often or lasts too long, it can have bad effects. Long-term stress can make you more likely to get sick, and it can make symptoms of some diseases worse. If you tense up when you are stressed, you may develop neck, shoulder, or low back pain. Stress is linked to high blood pressure and heart disease.  Stress also harms your emotional health. It can make you wahl, tense, or depressed. Your  relationships may suffer, and you may not do well at work or school.  What can you do to manage stress?  You can try these things to help manage stress:   Do something active. Exercise or activity can help reduce stress. Walking is a great way to get started. Even everyday activities such as housecleaning or yard work can help.  Try yoga or juan r chi. These techniques combine exercise and meditation. You may need some training at first to learn them.  Do something you enjoy. For example, listen to music or go to a movie. Practice your hobby or do volunteer work.  Meditate. This can help you relax, because you are not worrying about what happened before or what may happen in the future.  Do guided imagery. Imagine yourself in any setting that helps you feel calm. You can use online videos, books, or a teacher to guide you.  Do breathing exercises. For example:  From a standing position, bend forward from the waist with your knees slightly bent. Let your arms dangle close to the floor.  Breathe in slowly and deeply as you return to a standing position. Roll up slowly and lift your head last.  Hold your breath for just a few seconds in the standing position.  Breathe out slowly and bend forward from the waist.  Let your feelings out. Talk, laugh, cry, and express anger when you need to. Talking with supportive friends or family, a counselor, or a reagan leader about your feelings is a healthy way to relieve stress. Avoid discussing your feelings with people who make you feel worse.  Write. It may help to write about things that are bothering you. This helps you find out how much stress you feel and what is causing it. When you know this, you can find better ways to cope.  What can you do to prevent stress?  You might try some of these things to help prevent stress:  Manage your time. This helps you find time to do the things you want and need to do.  Get enough sleep. Your body recovers from the stresses of the day while  "you are sleeping.  Get support. Your family, friends, and community can make a difference in how you experience stress.  Limit your news feed. Avoid or limit time on social media or news that may make you feel stressed.  Do something active. Exercise or activity can help reduce stress. Walking is a great way to get started.  Where can you learn more?  Go to https://www.[facility].net/patiented  Enter N032 in the search box to learn more about \"Learning About Stress.\"  Current as of: October 24, 2023  Content Version: 14.2 2024 [facility] "[employer].   Care instructions adapted under license by your healthcare professional. If you have questions about a medical condition or this instruction, always ask your healthcare professional. Healthwise[employer] Incorporated disclaims any warranty or liability for your use of this information.       "

## 2024-11-13 NOTE — ASSESSMENT & PLAN NOTE
Breast self-exam starting at age 35  Annual clinical breast exams starting at age 35  Consider annual mammogram screening in men with gynecomastia starting at age 50 or 10 years prior to the earliest male breast cancer in the family  Prostate cancer screening, per NCCN guidelines, can be considered starting at age 40.

## 2024-11-13 NOTE — PROGRESS NOTES
Preventive Care Visit  [facility]  Fe Garland PA-C, Physician Assistant - Medical  2024      Assessment & Plan     Routine general medical examination at a health care facility  Routine labs updated.  Prostate cancer screening- completed today.  Colon Cancer screening- up to date  - PRIMARY CARE FOLLOW-UP SCHEDULING  - CBC with platelets  - Comprehensive metabolic panel  - Hemoglobin A1c  - Lipid panel reflex to direct LDL Fasting  - TSH with free T4 reflex    BRCA1 gene mutation positive in male  Patient positive for BRCA 1, reviewed recommendations below.  Mammogram ordered, no family hx of pancreatic cancer.  PSA updated today.  Colonoscopy up to date.  Encouraged him to have his child see  for future testing.  INDIVIDUALIZED CANCER RISK MANAGEMENT PLAN:       Individualized Surveillance Plan  BRCA1 and BRCA2 Management for Men   Per NCCN Guidelines Version2.   Test or procedure Last done Next Scheduled    Breast self-exam training and education, starting at age 35.  Discussed 2021 Will follow with primary care provider   Clinical breast exam, every 12 months starting at age 35. Not addressed, video visit  Will follow with primary care provider   Consider annual mammogram in men with gynomastia starting at age 50 or 10 years prior to earliest male breast cancer    No physical exam today; patient notes no breast issues except mole on skin  To be determined by primary care provider   Starting at age 40:  BRCA2+ - recommend prostate screening     BRCA1+ - consider prostate screening    None to date    PSA ordered; will follow with PCP   Pancreatic Cancer Screening beginning at age 50 or 10 years prior to the earliest pancreatic cancer on the BRCA-side of the family  In families with exocrine pancreatic cancer in a first or second degree relative     MRI/MRCP and/or Endoscopic Ultrasound    Patient is unsure whether maternal uncle  of  "pancreatic cancer; will confirm and let me know    Will refer to GI pancreatic screening program at [facility] if  There is family history of pancreatic cancer.   No specific guidelines for melanoma exist, but if appropriate, annual fully body skim exams and melanoma prevention strategies should be considered.           - MA Screen Bilateral w/Jeremy    Screening for prostate cancer  - Prostate Specific Antigen Screen    Overweight  Chronic issue, BMI elevated, recommend lifestyle changes.    History of colonic polyps  Chronic issue, colonoscopy up to date.    Chest pain, unspecified type  Patient endorses periodic chest pain when angry, without associated symptoms.  EKG normal in office, will order stress test to further evaluate.  Discussed I suspect this is related to emotional strain rather than chest pain but if occurs with radiation, occurs with activity should be seen in person.  - EKG 12-lead, tracing only  - Exercise Stress Test - Adult    Cervicalgia  New problem, patient endorses posterior scalp pain with bump, discussed likely due to neck tension from stress.  Advised heat, gentle stretching, stress reduction.  Follow up if symptoms worsen or do not improve.      Patient has been advised of split billing requirements and indicates understanding: Yes        BMI  Estimated body mass index is 27.95 kg/m  as calculated from the following:    Height as of this encounter: 1.734 m (5' 8.25\").    Weight as of this encounter: 84 kg (185 lb 3.2 oz).   Weight management plan: Discussed healthy diet and exercise guidelines    Counseling  Appropriate preventive services were addressed with this patient via screening, questionnaire, or discussion as appropriate for fall prevention, nutrition, physical activity, Tobacco-use cessation, social engagement, weight loss and cognition.  Checklist reviewing preventive services available has been given to the patient.  Reviewed patient's diet, addressing concerns and/or " questions.   He is at risk for lack of exercise and has been provided with information to increase physical activity for the benefit of his well-being.   He is at risk for psychosocial distress and has been provided with information to reduce risk.       Risks, benefits and alternatives were discussed with patient. Agreeable to the plan of care.      Sarah Blount is a 54 year old, presenting for the following:  Physical (Not fasting, ), Back Pain (Having back pain, /Bulge on the back on head that sticks out and hurts when pressed on. ), and Chest Pain (Chest pain that feels like a needle to the chest when angry. 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   When angry he notes chest pain, does not get chest ain with stress or anxiety.  Feels needle on chest, lasts for a few seconds  Never exertional  No irregular heart beats, no shortness of breath, no radiation of pain    Back pain: has a spot on scalp posterior that has bene there for awhile, tender to palatpion        11/13/2024     7:29 AM   Additional Questions   Roomed by Viji ARENAS CMA          Back Pain       Health Care Directive  Patient does not have a Health Care Directive: Discussed advance care planning with patient; information given to patient to review.      11/8/2024   General Health   How would you rate your overall physical health? Good   Feel stress (tense, anxious, or unable to sleep) To some extent      (!) STRESS CONCERN      11/8/2024   Nutrition   Three or more servings of calcium each day? Yes   Diet: Regular (no restrictions)   How many servings of fruit and vegetables per day? (!) 2-3   How many sweetened beverages each day? 0-1            11/8/2024   Exercise   Days per week of moderate/strenous exercise 1 day   Average minutes spent exercising at this level 40 min      (!) EXERCISE CONCERN      11/8/2024   Social Factors   Frequency of gathering with friends or relatives Three times a week   Worry food won't last until get  money to buy more No   Food not last or not have enough money for food? No   Do you have housing? (Housing is defined as stable permanent housing and does not include staying ouside in a car, in a tent, in an abandoned building, in an overnight shelter, or couch-surfing.) Yes   Are you worried about losing your housing? No   Lack of transportation? No   Unable to get utilities (heat,electricity)? No            11/13/2024   Fall Risk   Gait Speed Test (Document in seconds) 2.4   Gait Speed Test Interpretation Less than or equal to 5.00 seconds - PASS             11/8/2024   Dental   Dentist two times every year? Yes            11/8/2024   TB Screening   Were you born outside of the [place]? Yes            Today's PHQ-2 Score:       11/13/2024     7:29 AM   PHQ-2 ( 1999 [employer])   Q1: Little interest or pleasure in doing things 0    Q2: Feeling down, depressed or hopeless 0    PHQ-2 Score 0    Q1: Little interest or pleasure in doing things Not at all   Q2: Feeling down, depressed or hopeless Not at all   PHQ-2 Score 0       Patient-reported           11/8/2024   Substance Use   Alcohol more than 3/day or more than 7/wk No   Do you use any other substances recreationally? No        Social History     Tobacco Use    Smoking status: Former     Current packs/day: 0.00     Types: Cigarettes     Quit date: 06/2021     Years since quitting: 3.4    Smokeless tobacco: Never    Tobacco comments:     Previously was smoking 4 cigs/day or less x 10 years.   Substance Use Topics    Alcohol use: No    Drug use: No           11/8/2024   STI Screening   New sexual partner(s) since last STI/HIV test? No      Last PSA:   Prostate Specific Antigen Screen   Date Value Ref Range Status   11/14/2023 1.91 0.00 - 3.50 ng/mL Final     PSA Tumor Marker   Date Value Ref Range Status   09/07/2021 1.77 0.00 - 3.50 ug/L Final     ASCVD Risk   The 10-year ASCVD risk score (Joshua NUNES, et al., 2019) is: 4.9%    Values used to calculate the  score:      Age: 54 years      Sex: Male      Is Non- : No      Diabetic: No      Tobacco smoker: No      Systolic Blood Pressure: 114 mmHg      Is BP treated: No      HDL Cholesterol: 43 mg/dL      Total Cholesterol: 189 mg/dL         Reviewed and updated as needed this visit by Provider                    Patient Active Problem List   Diagnosis    BRCA1 gene mutation positive in male    Overweight     Past Surgical History:   Procedure Laterality Date    COLONOSCOPY N/A 9/10/2021    Procedure: COLONOSCOPY, WITH POLYPECTOMY AND BIOPSY;  Surgeon: John Paul Rodriguez MD;  Location: [facility] [place] OR       Social History     Tobacco Use    Smoking status: Former     Current packs/day: 0.00     Types: Cigarettes     Quit date: 06/2021     Years since quitting: 3.4    Smokeless tobacco: Never    Tobacco comments:     Previously was smoking 4 cigs/day or less x 10 years.   Substance Use Topics    Alcohol use: No     Family History   Problem Relation Age of Onset    Breast Cancer Mother 56        passed at 61    Ovarian Cancer Mother 42    Breast Cancer Maternal Grandmother         unknown age    Coronary Artery Disease Maternal Grandfather     Coronary Artery Disease Paternal Grandfather     Other - See Comments Maternal Aunt         BRCA1 positive    Coronary Artery Disease Maternal Uncle         late 50's; smoker and ETOH    Throat cancer Maternal Uncle 56        smoker, drinker    Breast Cancer Cousin 42        BRCA1 positive; Arleen's daughter    Lymphoma Cousin 49         Current Outpatient Medications   Medication Sig Dispense Refill    ciclopirox (LOPROX) 0.77 % cream Apply twice a day to rash in groin for 4 weeks. 90 g 0     No Known Allergies      Review of Systems  Constitutional, HEENT, cardiovascular, pulmonary, gi and gu systems are negative, except as otherwise noted.  See below.    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   When angry he notes chest pain, does not get chest ain with stress or  "anxiety.  Feels needle on chest, lasts for a few seconds  Never exertional  No irregular heart beats, no shortness of breath, no radiation of pain    Back pain: has a spot on scalp posterior that has bene there for awhile, tender to palatpion       Objective    Exam  /78 (BP Location: Left arm, Patient Position: Sitting, Cuff Size: Adult Regular)   Pulse 67   Temp 98.4  F (36.9  C) (Oral)   Resp 14   Ht 1.734 m (5' 8.25\")   Wt 84 kg (185 lb 3.2 oz)   SpO2 99%   BMI 27.95 kg/m     Estimated body mass index is 27.95 kg/m  as calculated from the following:    Height as of this encounter: 1.734 m (5' 8.25\").    Weight as of this encounter: 84 kg (185 lb 3.2 oz).    Physical Exam  GENERAL: alert and no distress  EYES: Eyes grossly normal to inspection, PERRL and conjunctivae and sclerae normal  HENT: ear canals and TM's normal, nose and mouth without ulcers or lesions  NECK: no adenopathy, no asymmetry, masses, or scars, + neck muscle tenderness at base of skull  RESP: lungs clear to auscultation - no rales, rhonchi or wheezes  BREAST: normal without masses, tenderness or nipple discharge and no palpable axillary masses or adenopathy  CV: regular rate and rhythm, normal S1 S2, no S3 or S4, no murmur, click or rub, no peripheral edema  ABDOMEN: soft, nontender, no hepatosplenomegaly, no masses and bowel sounds normal  MS: no gross musculoskeletal defects noted, no edema  SKIN: no suspicious lesions or rashes  NEURO: Normal strength and tone, mentation intact and speech normal  PSYCH: mentation appears normal, affect normal/bright        Signed Electronically by: Fe Garland PA-C    "

## 2024-11-14 ENCOUNTER — ANCILLARY PROCEDURE (OUTPATIENT)
Dept: MAMMOGRAPHY | Facility: CLINIC | Age: 54
End: 2024-11-14
Attending: PHYSICIAN ASSISTANT
Payer: COMMERCIAL

## 2024-11-14 DIAGNOSIS — Z15.01 BRCA1 GENE MUTATION POSITIVE IN MALE: ICD-10-CM

## 2024-11-14 DIAGNOSIS — Z15.09 BRCA1 GENE MUTATION POSITIVE IN MALE: ICD-10-CM

## 2024-11-14 DIAGNOSIS — Z15.03 BRCA1 GENE MUTATION POSITIVE IN MALE: ICD-10-CM

## 2024-11-14 PROCEDURE — 77063 BREAST TOMOSYNTHESIS BI: CPT

## 2024-11-25 ENCOUNTER — TELEPHONE (OUTPATIENT)
Dept: FAMILY MEDICINE | Facility: CLINIC | Age: 54
End: 2024-11-25
Payer: COMMERCIAL

## 2024-11-25 NOTE — TELEPHONE ENCOUNTER
"Called patient to discuss results message below. Patient verbalized understanding and is scheduled for lab appointment 5/26/25    CAITLYN Carballo,     Here are your recent labs:     Total cholesterol is high at 206, please continue exercise and watch diet.  Triglycerides are normal at 148, this is simple sugar and fat in blood. HDL which is the \"good\" cholesterol (heart protective)  is good at 43. Increase this with more exercise.  The LDL or \"bad\" cholesterol is at 133 but does not require medication at this time.     The 10-year ASCVD risk score (Joshua NUNES, et al., 2019) is: 5.4%    Values used to calculate the score:      Age: 54 years      Sex: Male      Is Non- : No      Diabetic: No      Tobacco smoker: No      Systolic Blood Pressure: 114 mmHg      Is BP treated: No      HDL Cholesterol: 43 mg/dL      Total Cholesterol: 206 mg/dL     Your CBC shows no evidence of infection or anemia.  Your CMP reveals normal kidney function, liver function and electrolytes.  Your glucose was 145.  Your hemoglobin A1C which checks for diabetes was normal at 5.3.  Your thyroid test was normal as well.  Your PSA is normal at 2.42. It is within normal range but we will want to monitor the change of velocity given your history and plan for recheck in 6 months.     Please schedule a lab only visit in 6 months.     If you have any questions or concerns, please do not hesitate to contact me.     Take care,   Fe Garland PA-C     "

## 2025-05-27 ENCOUNTER — LAB (OUTPATIENT)
Dept: LAB | Facility: CLINIC | Age: 55
End: 2025-05-27
Payer: COMMERCIAL

## 2025-05-27 DIAGNOSIS — Z15.01 BRCA1 GENE MUTATION POSITIVE IN MALE: ICD-10-CM

## 2025-05-27 DIAGNOSIS — Z15.03 BRCA1 GENE MUTATION POSITIVE IN MALE: ICD-10-CM

## 2025-05-27 DIAGNOSIS — Z15.09 BRCA1 GENE MUTATION POSITIVE IN MALE: ICD-10-CM

## 2025-05-27 DIAGNOSIS — Z12.5 SCREENING FOR PROSTATE CANCER: ICD-10-CM

## 2025-05-27 LAB — PSA SERPL DL<=0.01 NG/ML-MCNC: 3.08 NG/ML (ref 0–3.5)

## 2025-05-27 PROCEDURE — G0103 PSA SCREENING: HCPCS

## 2025-05-27 PROCEDURE — 36415 COLL VENOUS BLD VENIPUNCTURE: CPT

## 2025-05-28 ENCOUNTER — RESULTS FOLLOW-UP (OUTPATIENT)
Dept: FAMILY MEDICINE | Facility: CLINIC | Age: 55
End: 2025-05-28
Payer: COMMERCIAL

## 2025-05-28 DIAGNOSIS — R97.20 RISING PSA LEVEL: Primary | ICD-10-CM

## 2025-05-28 DIAGNOSIS — Z15.03 BRCA1 GENE MUTATION POSITIVE IN MALE: ICD-10-CM

## 2025-05-28 DIAGNOSIS — Z15.09 BRCA1 GENE MUTATION POSITIVE IN MALE: ICD-10-CM

## 2025-05-28 DIAGNOSIS — Z15.01 BRCA1 GENE MUTATION POSITIVE IN MALE: ICD-10-CM

## 2025-05-29 ENCOUNTER — TELEPHONE (OUTPATIENT)
Dept: UROLOGY | Facility: CLINIC | Age: 55
End: 2025-05-29
Payer: COMMERCIAL

## 2025-05-29 ENCOUNTER — PATIENT OUTREACH (OUTPATIENT)
Dept: CARE COORDINATION | Facility: CLINIC | Age: 55
End: 2025-05-29
Payer: COMMERCIAL

## 2025-05-29 NOTE — TELEPHONE ENCOUNTER
Health Call Center    Phone Message    May a detailed message be left on voicemail: yes     Reason for Call: Appointment Intake    Referring Provider Name:  SANDRA YOUNGBLOOD  Diagnosis and/or Symptoms: Rising PSA level  BRCA1 gene mutation positive in male    Patient has this referral but writer didn't know which provider sees for the gene mutation. Please call patient to schedule. He's willing to go to [place] or [place]. Thank you!      Action Taken: Message routed to:  [facility] ([facility]): Sandra murray    Travel Screening: Not Applicable     Date of Service:

## 2025-06-02 ENCOUNTER — PATIENT OUTREACH (OUTPATIENT)
Dept: CARE COORDINATION | Facility: CLINIC | Age: 55
End: 2025-06-02
Payer: COMMERCIAL

## 2025-06-03 NOTE — TELEPHONE ENCOUNTER
Called patient and relayed information/instructions from provider. Patient has no further questions at this time and will follow up as needed/instructed.    Howard Evans RN     [facility]

## 2025-07-01 ENCOUNTER — OFFICE VISIT (OUTPATIENT)
Dept: UROLOGY | Facility: CLINIC | Age: 55
End: 2025-07-01
Payer: COMMERCIAL

## 2025-07-01 VITALS
HEART RATE: 78 BPM | DIASTOLIC BLOOD PRESSURE: 86 MMHG | BODY MASS INDEX: 28.5 KG/M2 | OXYGEN SATURATION: 98 % | WEIGHT: 188.8 LBS | SYSTOLIC BLOOD PRESSURE: 129 MMHG

## 2025-07-01 DIAGNOSIS — Z15.03 BRCA1 GENE MUTATION POSITIVE IN MALE: ICD-10-CM

## 2025-07-01 DIAGNOSIS — R97.20 RISING PSA LEVEL: Primary | ICD-10-CM

## 2025-07-01 DIAGNOSIS — Z15.01 BRCA1 GENE MUTATION POSITIVE IN MALE: ICD-10-CM

## 2025-07-01 DIAGNOSIS — Z15.09 BRCA1 GENE MUTATION POSITIVE IN MALE: ICD-10-CM

## 2025-07-01 DIAGNOSIS — N40.1 BENIGN PROSTATIC HYPERPLASIA WITH LOWER URINARY TRACT SYMPTOMS, SYMPTOM DETAILS UNSPECIFIED: ICD-10-CM

## 2025-07-01 PROCEDURE — 51798 US URINE CAPACITY MEASURE: CPT | Performed by: UROLOGY

## 2025-07-01 PROCEDURE — 99204 OFFICE O/P NEW MOD 45 MIN: CPT | Performed by: UROLOGY

## 2025-07-01 PROCEDURE — 3074F SYST BP LT 130 MM HG: CPT | Performed by: UROLOGY

## 2025-07-01 PROCEDURE — 3079F DIAST BP 80-89 MM HG: CPT | Performed by: UROLOGY

## 2025-07-01 NOTE — PROGRESS NOTES
S: Mine Whitaker is a pleasant  55 year old male who was requested to be seen by  Fe Garland for a consult with regard to patient's elevated PSA.  His recent PSA was found to be   Prostate Specific Antigen Screen   Date Value Ref Range Status   05/27/2025 3.08 0.00 - 3.50 ng/mL Final     PSA Tumor Marker   Date Value Ref Range Status   09/07/2021 1.77 0.00 - 3.50 ug/L Final   .  His previous PSA was normal but has been rising.  Patient complains of minimal LUTS.  He has no history of elevated PSA.  His AUA Symptom Score:  9.  Current Outpatient Medications   Medication Sig Dispense Refill    ciclopirox (LOPROX) 0.77 % cream Apply twice a day to rash in groin for 4 weeks. (Patient not taking: Reported on 7/1/2025) 90 g 0      No Known Allergies   Past Medical History:   Diagnosis Date    BRCA1 gene mutation positive in male     History of Achilles tendon repair 7/29/2015 2013      Past Surgical History:   Procedure Laterality Date    COLONOSCOPY N/A 9/10/2021    Procedure: COLONOSCOPY, WITH POLYPECTOMY AND BIOPSY;  Surgeon: John Paul Rodriguez MD;  Location: [facility] OR      Family History   Problem Relation Age of Onset    Breast Cancer Mother 56        passed at 61    Ovarian Cancer Mother 42    Breast Cancer Maternal Grandmother         unknown age    Coronary Artery Disease Maternal Grandfather     Coronary Artery Disease Paternal Grandfather     Other - See Comments Maternal Aunt         BRCA1 positive    Coronary Artery Disease Maternal Uncle         late 50's; smoker and ETOH    Throat cancer Maternal Uncle 56        smoker, drinker    Breast Cancer Cousin 42        BRCA1 positive; Arleen's daughter    Lymphoma Cousin 49     He does not have a family history of prostate cancer.  Social History     Socioeconomic History    Marital status:      Spouse name: Not on file    Number of children: Not on file    Years of education: Not on file    Highest education level: Not on file   Occupational  History    Not on file   Tobacco Use    Smoking status: Former     Current packs/day: 0.00     Types: Cigarettes     Quit date: 2021     Years since quittin.0    Smokeless tobacco: Never    Tobacco comments:     Previously was smoking 4 cigs/day or less x 10 years.   Substance and Sexual Activity    Alcohol use: No    Drug use: No    Sexual activity: Not on file   Other Topics Concern    Not on file   Social History Narrative    He is originally from [place], but moved here 6 years ago for more opportunities for his family. He is  and has a 5 year old son and 10 year daughter. 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 Went back to [place] this past summer to  to visit. - Dr. Oakley 22     Social Drivers of Health     Financial Resource Strain: Low Risk  (2024)    Financial Resource Strain     Within the past 12 months, have you or your family members you live with been unable to get utilities (heat, electricity) when it was really needed?: No   Food Insecurity: Low Risk  (2024)    Food Insecurity     Within the past 12 months, did you worry that your food would run out before you got money to buy more?: No     Within the past 12 months, did the food you bought just not last and you didn t have money to get more?: No   Transportation Needs: Low Risk  (2024)    Transportation Needs     Within the past 12 months, has lack of transportation kept you from medical appointments, getting your medicines, non-medical meetings or appointments, work, or from getting things that you need?: No   Physical Activity: Insufficiently Active (2024)    Exercise Vital Sign     Days of Exercise per Week: 1 day     Minutes of Exercise per Session: 40 min   Stress: Stress Concern Present (2024)    [employer] - Occupational Stress Questionnaire     Feeling of Stress : To some extent   Social Connections:  Unknown (11/8/2024)    Social Connection and Isolation Panel [NHANES]     Frequency of Communication with Friends and Family: Not on file     Frequency of Social Gatherings with Friends and Family: Three times a week     Attends Jehovah's witness Services: Not on file     Active Member of Clubs or Organizations: Not on file     Attends Club or Organization Meetings: Not on file     Marital Status: Not on file   Interpersonal Safety: Low Risk  (11/13/2024)    Interpersonal Safety     Do you feel physically and emotionally safe where you currently live?: Yes     Within the past 12 months, have you been hit, slapped, kicked or otherwise physically hurt by someone?: No     Within the past 12 months, have you been humiliated or emotionally abused in other ways by your partner or ex-partner?: No   Housing Stability: Low Risk  (11/8/2024)    Housing Stability     Do you have housing? : Yes     Are you worried about losing your housing?: No        REVIEW OF SYSTEMS  =================  C: NEGATIVE for fever, chills, change in weight  I: NEGATIVE for worrisome rashes, moles or lesions  E/M: NEGATIVE for ear, mouth and throat problems  R: NEGATIVE for significant cough or SHORTNESS OF BREATH  CV:  NEGATIVE for chest pain, palpitations or peripheral edema  GI: NEGATIVE for nausea, abdominal pain, heartburn, or change in bowel habits  NEURO: NEGATIVE  PSYCH: NEGATIVE    Physical Exam:  /86   Pulse 78   Wt 85.6 kg (188 lb 12.8 oz)   SpO2 98%   BMI 28.50 kg/m     Patient is pleasant, in no acute distress, good general condition.  Lung: no evidence of respiratory distress    Abdomen: Soft, nondistended, non tender. No masses. No rebound or guarding.   Exam: penis no discharge.  Testis no masses.  No scrotal skin lesion.  Prostate 50 gm smooth  Skin: Warm and dry.  No redness.  Musculaskeletal: moving all extremities.  No weakness.  Neuro non focal  Psych normal mood and affect    Assessment/Plan:   (R97.20) Rising PSA level   (primary encounter diagnosis)  Comment:  briefly discussed prostate biopsy   Plan: MEASURE POST-VOID RESIDUAL URINE/BLADDER         CAPACITY, US NON-IMAGING (49606), MR Prostate         wo & w Contrast             (Z15.01,  Z15.09,  Z15.03) BRCA1 gene mutation positive in male  Comment:    Plan:  see above    (N40.1) Benign prostatic hyperplasia with lower urinary tract symptoms, symptom details unspecified  Comment:    Plan: prn

## 2025-07-01 NOTE — PATIENT INSTRUCTIONS
Please call the Imaging scheduling at [phone] and say  Imaging   to schedule an MRI of the prostate. Please note: these are done at our [place] or [place] locations  2. Please contact your insurance company to make sure the MRI scan is covered under your insurance plan.   3. We will follow up with you once these results are available and determine which type of prostate biopsy (if any) is most appropriate for you.

## 2025-07-01 NOTE — PROGRESS NOTES
2-3 Espresso  2 WATER  1 Milk  Bladder scan performed 10ml detected in bladder. Radha Samuels, CMA

## 2025-07-18 ENCOUNTER — ANCILLARY PROCEDURE (OUTPATIENT)
Dept: MRI IMAGING | Facility: CLINIC | Age: 55
End: 2025-07-18
Attending: UROLOGY
Payer: COMMERCIAL

## 2025-07-18 DIAGNOSIS — R97.20 RISING PSA LEVEL: ICD-10-CM

## 2025-07-18 PROCEDURE — 72197 MRI PELVIS W/O & W/DYE: CPT | Performed by: RADIOLOGY

## 2025-07-18 PROCEDURE — A9585 GADOBUTROL INJECTION: HCPCS | Performed by: RADIOLOGY

## 2025-07-18 RX ORDER — GADOBUTROL 604.72 MG/ML
10 INJECTION INTRAVENOUS ONCE
Status: COMPLETED | OUTPATIENT
Start: 2025-07-18 | End: 2025-07-18

## 2025-07-18 RX ADMIN — GADOBUTROL 8.5 ML: 604.72 INJECTION INTRAVENOUS at 11:23

## 2025-07-18 NOTE — DISCHARGE INSTRUCTIONS

## 2025-07-24 ENCOUNTER — VIRTUAL VISIT (OUTPATIENT)
Dept: UROLOGY | Facility: CLINIC | Age: 55
End: 2025-07-24
Payer: COMMERCIAL

## 2025-07-24 DIAGNOSIS — R97.20 RISING PSA LEVEL: Primary | ICD-10-CM

## 2025-07-24 NOTE — PROGRESS NOTES
Mine is a 55 year old who is being evaluated via a billable video visit.    How would you like to obtain your AVS? MyChart  If the video visit is dropped, the invitation should be resent by: Text to cell phone: [phone]  Will anyone else be joining your video visit? No          Subjective   Mine is a 55 year old, presenting for the following health issues:  Elevated PSA    HPI      Patient is a pleasant 55-year-old male with rising PSA.  He is recent prostate MRI showed a 44 g prostate with only PI-RADS 2 nodule.      Review of Systems  Constitutional, HEENT, cardiovascular, pulmonary, gi and gu systems are negative, except as otherwise noted.      Objective           Vitals:  No vitals were obtained today due to virtual visit.    Physical Exam   GENERAL: alert and no distress  EYES: Eyes grossly normal to inspection.  No discharge or erythema, or obvious scleral/conjunctival abnormalities.  RESP: No audible wheeze, cough, or visible cyanosis.    SKIN: Visible skin clear. No significant rash, abnormal pigmentation or lesions.  NEURO: Cranial nerves grossly intact.  Mentation and speech appropriate for age.  PSYCH: Appropriate affect, tone, and pace of words    MR Prostate wo & w Contrast  Result Date: 7/18/2025  MRI PROSTATE: 7/18/2025 11:22 AM CLINICAL HISTORY: Rising PSA level Most Recent PSA: 3.08 ug/L Comparison: None. TECHNIQUE: The following sequences were obtained: High-resolution axial T2-weighted, coronal T2-weighted, 3D volumetric T2-weighted, axial pre-contrast T1, axial diffusion-weighted, axial apparent diffusion coefficient and axial dynamic contrast-enhanced T1. Postcontrast images were evaluated on a separate workstation to evaluate dynamic contrast enhancement. The technique of this exam is PI-RADS v2.1 compliant. Contrast dose: 8.5mL Gadavist FINDINGS: Prostate quality (PI-QUAL) T2-weighted images: 4/4 Diffusion-weighted images: 4/4 Dynamic contrast enhancement images: Positive PI-QUAL  score: 3 - Optimal quality Size: 5.2 x 4.9 x 3.2 cm, 42 grams Hemorrhage: Absent Peripheral zone: Heterogeneous on T2-weighted images. Linear and wedge-shaped regions of mildly decreased signal on ADC map or T2-weighted images which are best characterized as PI-RADS 2 without highly suspicious lesion. Transition zone: Enlarged with BPH changes. Transition zone nodules which are circumscribed or mostly encapsulated without diffusion restriction.  PI-RADS 2.  No highly suspicious nodules.     IMPRESSION: 1. Based on the most suspicious abnormality, this exam is characterized as PIRADS 2 - Clinically significant cancer is unlikely to be present.  2. No suspicious adenopathy or evidence of pelvic metastases. PIRADS? v2.1 Assessment Categories PIRADS 1: Very low (clinically significant cancer is highly unlikely to be present) PIRADS 2: Low (clinically significant cancer is unlikely to be present) PIRADS 3: Intermediate (the presence of clinically significant cancer is equivocal) PIRADS 4: High (clinically significant cancer is likely to be present) PIRADS 5: Very high (clinically significant cancer is highly likely to be present) I have personally reviewed the examination and initial interpretation and I agree with the findings. BRANDON CHOE MD   SYSTEM ID:  [id number]      Rising PSA: Normal prostate MRI.  Schedule patient for a total and free PSA in 6 months.    Video-Visit Details    Type of service:  Video Visit   Originating Location (pt. Location): Home    Distant Location (provider location):  Off-site  Platform used for Video Visit: [email or web address]  Signed Electronically by: Alberto Tubbs MD

## (undated) DEVICE — SPECIMEN CONTAINER 3OZ W/FORMALIN 59901

## (undated) DEVICE — KIT ENDO TURNOVER/PROCEDURE CARRY-ON 101822

## (undated) DEVICE — SOL WATER IRRIG 500ML BOTTLE 2F7113

## (undated) DEVICE — GOWN IMPERVIOUS 2XL BLUE

## (undated) DEVICE — TUBING SUCTION 12"X1/4" N612

## (undated) DEVICE — ENDO FORCEP SPIKED SERRATED SHAFT JUMBO 239CM G56998

## (undated) DEVICE — SUCTION MANIFOLD NEPTUNE 2 SYS 1 PORT 702-025-000

## (undated) RX ORDER — DIPHENHYDRAMINE HYDROCHLORIDE 50 MG/ML
INJECTION INTRAMUSCULAR; INTRAVENOUS
Status: DISPENSED
Start: 2021-09-10

## (undated) RX ORDER — FENTANYL CITRATE 50 UG/ML
INJECTION, SOLUTION INTRAMUSCULAR; INTRAVENOUS
Status: DISPENSED
Start: 2021-09-10